# Patient Record
Sex: MALE | Race: WHITE | NOT HISPANIC OR LATINO | Employment: FULL TIME | URBAN - METROPOLITAN AREA
[De-identification: names, ages, dates, MRNs, and addresses within clinical notes are randomized per-mention and may not be internally consistent; named-entity substitution may affect disease eponyms.]

---

## 2018-03-12 ENCOUNTER — HISTORICAL (OUTPATIENT)
Dept: ADMINISTRATIVE | Facility: HOSPITAL | Age: 43
End: 2018-03-12

## 2018-03-12 LAB
ALBUMIN SERPL-MCNC: 4.4 GM/DL (ref 3.4–5)
ALBUMIN/GLOB SERPL: 1.67 {RATIO} (ref 1.5–2.5)
ALP SERPL-CCNC: 45 UNIT/L (ref 38–126)
ALT SERPL-CCNC: 20 UNIT/L (ref 7–52)
AST SERPL-CCNC: 15 UNIT/L (ref 15–37)
BILIRUB SERPL-MCNC: 0.8 MG/DL (ref 0.2–1)
BILIRUBIN DIRECT+TOT PNL SERPL-MCNC: 0.2 MG/DL (ref 0–0.5)
BUN SERPL-MCNC: 21 MG/DL (ref 7–18)
CALCIUM SERPL-MCNC: 9.4 MG/DL (ref 8.5–10)
CHLORIDE SERPL-SCNC: 100 MMOL/L (ref 98–107)
CHOLEST SERPL-MCNC: 144 MG/DL (ref 0–200)
CHOLEST/HDLC SERPL: 4.6 {RATIO}
CO2 SERPL-SCNC: 31 MMOL/L (ref 21–32)
CREAT SERPL-MCNC: 0.84 MG/DL (ref 0.6–1.3)
CREAT UR-MCNC: 200 MG/DL
CREAT/UREA NIT SERPL: 25
EST. AVERAGE GLUCOSE BLD GHB EST-MCNC: 157 MG/DL
GGT SERPL-CCNC: 30 UNIT/L (ref 5–85)
GLOBULIN SER-MCNC: 2.7 GM/DL (ref 1.2–3)
GLUCOSE SERPL-MCNC: 250 MG/DL (ref 74–106)
HBA1C MFR BLD: 7.1 % (ref 4.4–6.4)
HDLC SERPL-MCNC: 31 MG/DL (ref 35–60)
LDH SERPL-CCNC: 157 UNIT/L (ref 140–271)
LDLC SERPL CALC-MCNC: 88 MG/DL (ref 0–129)
MICROALBUMIN UR-MCNC: 30 MG/L
MICROALBUMIN/CREAT RATIO PNL UR: <30 MG/GM
POTASSIUM SERPL-SCNC: 5.1 MMOL/L (ref 3.5–5.1)
PROT SERPL-MCNC: 7.1 GM/DL (ref 6.4–8.2)
SODIUM SERPL-SCNC: 137 MMOL/L (ref 136–145)
TRIGL SERPL-MCNC: 120 MG/DL (ref 30–150)
TSH SERPL-ACNC: 4.22 MIU/ML (ref 0.35–4.94)
VLDLC SERPL CALC-MCNC: 24 MG/DL

## 2018-08-28 ENCOUNTER — HISTORICAL (OUTPATIENT)
Dept: ADMINISTRATIVE | Facility: HOSPITAL | Age: 43
End: 2018-08-28

## 2018-08-28 LAB
ABS NEUT (OLG): 3
ALBUMIN SERPL-MCNC: 4.5 GM/DL (ref 3.4–5)
ALBUMIN/GLOB SERPL: 1.61 {RATIO} (ref 1.5–2.5)
ALP SERPL-CCNC: 32 UNIT/L (ref 38–126)
ALT SERPL-CCNC: 27 UNIT/L (ref 7–52)
APPEARANCE, UA: CLEAR
AST SERPL-CCNC: 19 UNIT/L (ref 15–37)
BACTERIA #/AREA URNS AUTO: NORMAL /HPF
BILIRUB SERPL-MCNC: 0.5 MG/DL (ref 0.2–1)
BILIRUB UR QL STRIP: NEGATIVE MG/DL
BILIRUBIN DIRECT+TOT PNL SERPL-MCNC: 0.1 MG/DL (ref 0–0.5)
BILIRUBIN DIRECT+TOT PNL SERPL-MCNC: 0.4 MG/DL
BUN SERPL-MCNC: 22 MG/DL (ref 7–18)
CALCIUM SERPL-MCNC: 9.2 MG/DL (ref 8.5–10)
CHLORIDE SERPL-SCNC: 103 MMOL/L (ref 98–107)
CHOLEST SERPL-MCNC: 138 MG/DL (ref 0–200)
CHOLEST/HDLC SERPL: 4.5 {RATIO}
CO2 SERPL-SCNC: 27 MMOL/L (ref 21–32)
COLOR UR: YELLOW
CREAT SERPL-MCNC: 0.85 MG/DL (ref 0.6–1.3)
CREAT UR-MCNC: 100 MG/DL
ERYTHROCYTE [DISTWIDTH] IN BLOOD BY AUTOMATED COUNT: 13.1 % (ref 11.5–17)
EST. AVERAGE GLUCOSE BLD GHB EST-MCNC: 146 MG/DL
GLOBULIN SER-MCNC: 2.8 GM/DL (ref 1.2–3)
GLUCOSE (UA): NEGATIVE MG/DL
GLUCOSE SERPL-MCNC: 165 MG/DL (ref 74–106)
HBA1C MFR BLD: 6.7 % (ref 4.4–6.4)
HCT VFR BLD AUTO: 40.7 % (ref 42–52)
HDLC SERPL-MCNC: 31 MG/DL (ref 35–60)
HGB BLD-MCNC: 13.6 GM/DL (ref 14–18)
HGB UR QL STRIP: NEGATIVE UNIT/L
KETONES UR QL STRIP: NEGATIVE MG/DL
LDLC SERPL CALC-MCNC: 84 MG/DL (ref 0–129)
LEUKOCYTE ESTERASE UR QL STRIP: NEGATIVE UNIT/L
LYMPHOCYTES # BLD AUTO: 1.5 X10(3)/MCL (ref 0.6–3.4)
LYMPHOCYTES NFR BLD AUTO: 29.2 % (ref 13–40)
MCH RBC QN AUTO: 32.2 PG (ref 27–31.2)
MCHC RBC AUTO-ENTMCNC: 33 GM/DL (ref 32–36)
MCV RBC AUTO: 96 FL (ref 80–94)
MICROALBUMIN UR-MCNC: 30 MG/L
MICROALBUMIN/CREAT RATIO PNL UR: <30 MG/GM
MONOCYTES # BLD AUTO: 0.6 X10(3)/MCL (ref 0–1.8)
MONOCYTES NFR BLD AUTO: 10.8 % (ref 0.1–24)
NEUTROPHILS NFR BLD AUTO: 60 % (ref 47–80)
NITRITE UR QL STRIP.AUTO: NEGATIVE
PH UR STRIP: 5 [PH]
PLATELET # BLD AUTO: 214 X10(3)/MCL (ref 130–400)
PMV BLD AUTO: 9.1 FL
POTASSIUM SERPL-SCNC: 4.8 MMOL/L (ref 3.5–5.1)
PROT SERPL-MCNC: 7.3 GM/DL (ref 6.4–8.2)
PROT UR QL STRIP: NEGATIVE MG/DL
PSA SERPL-MCNC: 0.22 NG/ML (ref 0–2.5)
RBC # BLD AUTO: 4.22 X10(6)/MCL (ref 4.7–6.1)
RBC #/AREA URNS HPF: NORMAL /HPF
SODIUM SERPL-SCNC: 137 MMOL/L (ref 136–145)
SP GR UR STRIP: 1.02
SQUAMOUS EPITHELIAL, UA: NORMAL /LPF
TRIGL SERPL-MCNC: 147 MG/DL (ref 30–150)
TSH SERPL-ACNC: 4.61 MIU/ML (ref 0.35–4.94)
UROBILINOGEN UR STRIP-ACNC: 0.2 MG/DL
VLDLC SERPL CALC-MCNC: 29.4 MG/DL
WBC # SPEC AUTO: 5.1 X10(3)/MCL (ref 4.5–11.5)
WBC #/AREA URNS AUTO: NORMAL /[HPF]

## 2019-04-04 ENCOUNTER — HISTORICAL (OUTPATIENT)
Dept: ADMINISTRATIVE | Facility: HOSPITAL | Age: 44
End: 2019-04-04

## 2019-04-04 LAB
ALBUMIN SERPL-MCNC: 4.6 GM/DL (ref 3.4–5)
ALBUMIN/GLOB SERPL: 1.92 {RATIO} (ref 1.5–2.5)
ALP SERPL-CCNC: 33 UNIT/L (ref 38–126)
ALT SERPL-CCNC: 19 UNIT/L (ref 7–52)
AST SERPL-CCNC: 16 UNIT/L (ref 15–37)
BILIRUB SERPL-MCNC: 0.7 MG/DL (ref 0.2–1)
BILIRUBIN DIRECT+TOT PNL SERPL-MCNC: 0.1 MG/DL (ref 0–0.5)
BILIRUBIN DIRECT+TOT PNL SERPL-MCNC: 0.6 MG/DL
BUN SERPL-MCNC: 23 MG/DL (ref 7–18)
CALCIUM SERPL-MCNC: 9.7 MG/DL (ref 8.5–10)
CHLORIDE SERPL-SCNC: 100 MMOL/L (ref 98–107)
CHOLEST SERPL-MCNC: 162 MG/DL (ref 0–200)
CHOLEST/HDLC SERPL: 4.6 {RATIO}
CO2 SERPL-SCNC: 27 MMOL/L (ref 21–32)
CREAT SERPL-MCNC: 0.88 MG/DL (ref 0.6–1.3)
EST. AVERAGE GLUCOSE BLD GHB EST-MCNC: 169 MG/DL
GLOBULIN SER-MCNC: 2.4 GM/DL (ref 1.2–3)
GLUCOSE SERPL-MCNC: 157 MG/DL (ref 74–106)
HBA1C MFR BLD: 7.5 % (ref 4.4–6.4)
HDLC SERPL-MCNC: 35 MG/DL (ref 35–60)
LDLC SERPL CALC-MCNC: 82 MG/DL (ref 0–129)
POTASSIUM SERPL-SCNC: 4.9 MMOL/L (ref 3.5–5.1)
PROT SERPL-MCNC: 7 GM/DL (ref 6.4–8.2)
SODIUM SERPL-SCNC: 133 MMOL/L (ref 136–145)
TRIGL SERPL-MCNC: 223 MG/DL (ref 30–150)
TSH SERPL-ACNC: 12.86 MIU/ML (ref 0.35–4.94)
VLDLC SERPL CALC-MCNC: 44.6 MG/DL

## 2019-10-07 ENCOUNTER — HISTORICAL (OUTPATIENT)
Dept: ADMINISTRATIVE | Facility: HOSPITAL | Age: 44
End: 2019-10-07

## 2019-10-07 LAB
ABS NEUT (OLG): 3.3 X10(3)/MCL (ref 2.1–9.2)
ALBUMIN SERPL-MCNC: 4.4 GM/DL (ref 3.4–5)
ALBUMIN/GLOB SERPL: 1.52 {RATIO} (ref 1.5–2.5)
ALP SERPL-CCNC: 28 UNIT/L (ref 38–126)
ALT SERPL-CCNC: 19 UNIT/L (ref 7–52)
APPEARANCE, UA: CLEAR
AST SERPL-CCNC: 13 UNIT/L (ref 15–37)
BACTERIA #/AREA URNS AUTO: ABNORMAL /HPF
BILIRUB SERPL-MCNC: 0.6 MG/DL (ref 0.2–1)
BILIRUB UR QL STRIP: NEGATIVE MG/DL
BILIRUBIN DIRECT+TOT PNL SERPL-MCNC: 0.1 MG/DL (ref 0–0.5)
BILIRUBIN DIRECT+TOT PNL SERPL-MCNC: 0.5 MG/DL
BUN SERPL-MCNC: 15 MG/DL (ref 7–18)
CALCIUM SERPL-MCNC: 9.3 MG/DL (ref 8.5–10)
CHLORIDE SERPL-SCNC: 98 MMOL/L (ref 98–107)
CHOLEST SERPL-MCNC: 143 MG/DL (ref 0–200)
CHOLEST/HDLC SERPL: 4.1 {RATIO}
CO2 SERPL-SCNC: 29 MMOL/L (ref 21–32)
COLOR UR: YELLOW
CREAT SERPL-MCNC: 0.79 MG/DL (ref 0.6–1.3)
ERYTHROCYTE [DISTWIDTH] IN BLOOD BY AUTOMATED COUNT: 13.1 % (ref 11.5–17)
EST. AVERAGE GLUCOSE BLD GHB EST-MCNC: 171 MG/DL
GLOBULIN SER-MCNC: 2.9 GM/DL (ref 1.2–3)
GLUCOSE (UA): ABNORMAL MG/DL
GLUCOSE SERPL-MCNC: 236 MG/DL (ref 74–106)
HBA1C MFR BLD: 7.6 % (ref 4.4–6.4)
HCT VFR BLD AUTO: 39.3 % (ref 42–52)
HDLC SERPL-MCNC: 35 MG/DL (ref 35–60)
HGB BLD-MCNC: 13.6 GM/DL (ref 14–18)
HGB UR QL STRIP: NEGATIVE UNIT/L
KETONES UR QL STRIP: NEGATIVE MG/DL
LDLC SERPL CALC-MCNC: 81 MG/DL (ref 0–129)
LEUKOCYTE ESTERASE UR QL STRIP: NEGATIVE UNIT/L
LYMPHOCYTES # BLD AUTO: 1.6 X10(3)/MCL (ref 0.6–3.4)
LYMPHOCYTES NFR BLD AUTO: 28.2 % (ref 13–40)
MCH RBC QN AUTO: 32.2 PG (ref 27–31.2)
MCHC RBC AUTO-ENTMCNC: 35 GM/DL (ref 32–36)
MCV RBC AUTO: 93 FL (ref 80–94)
MONOCYTES # BLD AUTO: 0.6 X10(3)/MCL (ref 0.1–1.3)
MONOCYTES NFR BLD AUTO: 11.5 % (ref 0.1–24)
NEUTROPHILS NFR BLD AUTO: 60.3 % (ref 47–80)
NITRITE UR QL STRIP.AUTO: NEGATIVE
PH UR STRIP: 6 [PH]
PLATELET # BLD AUTO: 226 X10(3)/MCL (ref 130–400)
PMV BLD AUTO: 9.4 FL (ref 9.4–12.4)
POTASSIUM SERPL-SCNC: 4.4 MMOL/L (ref 3.5–5.1)
PROT SERPL-MCNC: 7.3 GM/DL (ref 6.4–8.2)
PROT UR QL STRIP: NEGATIVE MG/DL
PSA SERPL-MCNC: 0.18 NG/ML (ref 0–2.5)
RBC # BLD AUTO: 4.23 X10(6)/MCL (ref 4.7–6.1)
RBC #/AREA URNS HPF: ABNORMAL /HPF
SODIUM SERPL-SCNC: 135 MMOL/L (ref 136–145)
SP GR UR STRIP: 1.02
SQUAMOUS EPITHELIAL, UA: ABNORMAL /LPF
TRIGL SERPL-MCNC: 163 MG/DL (ref 30–150)
TSH SERPL-ACNC: 2.73 MIU/ML (ref 0.35–4.94)
UROBILINOGEN UR STRIP-ACNC: 0.2 MG/DL
VLDLC SERPL CALC-MCNC: 32.6 MG/DL
WBC # SPEC AUTO: 5.5 X10(3)/MCL (ref 4.5–11.5)
WBC #/AREA URNS AUTO: ABNORMAL /[HPF]

## 2020-05-13 ENCOUNTER — HISTORICAL (OUTPATIENT)
Dept: ADMINISTRATIVE | Facility: HOSPITAL | Age: 45
End: 2020-05-13

## 2020-05-13 LAB
ALBUMIN SERPL-MCNC: 4.5 GM/DL (ref 3.4–5)
ALBUMIN/GLOB SERPL: 1.61 {RATIO} (ref 1.5–2.5)
ALP SERPL-CCNC: 34 UNIT/L (ref 38–126)
ALT SERPL-CCNC: 15 UNIT/L (ref 7–52)
AST SERPL-CCNC: 12 UNIT/L (ref 15–37)
BILIRUB SERPL-MCNC: 0.7 MG/DL (ref 0.2–1)
BILIRUBIN DIRECT+TOT PNL SERPL-MCNC: 0.1 MG/DL (ref 0–0.5)
BILIRUBIN DIRECT+TOT PNL SERPL-MCNC: 0.6 MG/DL
BUN SERPL-MCNC: 14 MG/DL (ref 7–18)
CALCIUM SERPL-MCNC: 9.7 MG/DL (ref 8.5–10)
CHLORIDE SERPL-SCNC: 97 MMOL/L (ref 98–107)
CHOLEST SERPL-MCNC: 144 MG/DL (ref 0–200)
CHOLEST/HDLC SERPL: 4.1 {RATIO}
CO2 SERPL-SCNC: 31 MMOL/L (ref 21–32)
CREAT SERPL-MCNC: 0.8 MG/DL (ref 0.6–1.3)
EST. AVERAGE GLUCOSE BLD GHB EST-MCNC: 174 MG/DL
GLOBULIN SER-MCNC: 2.8 GM/DL (ref 1.2–3)
GLUCOSE SERPL-MCNC: 149 MG/DL (ref 74–106)
HBA1C MFR BLD: 7.7 % (ref 4.4–6.4)
HDLC SERPL-MCNC: 35 MG/DL (ref 35–60)
LDLC SERPL CALC-MCNC: 79 MG/DL (ref 0–129)
POTASSIUM SERPL-SCNC: 4.8 MMOL/L (ref 3.5–5.1)
PROT SERPL-MCNC: 7.3 GM/DL (ref 6.4–8.2)
SODIUM SERPL-SCNC: 136 MMOL/L (ref 136–145)
TRIGL SERPL-MCNC: 211 MG/DL (ref 30–150)
TSH SERPL-ACNC: 7.8 MIU/ML (ref 0.35–4.94)
VLDLC SERPL CALC-MCNC: 42.2 MG/DL

## 2020-11-24 ENCOUNTER — HISTORICAL (OUTPATIENT)
Dept: ADMINISTRATIVE | Facility: HOSPITAL | Age: 45
End: 2020-11-24

## 2020-11-24 LAB
ABS NEUT (OLG): 4.3 X10(3)/MCL (ref 2.1–9.2)
ALBUMIN SERPL-MCNC: 4.4 GM/DL (ref 3.4–5)
ALBUMIN/GLOB SERPL: 1.57 {RATIO} (ref 1.5–2.5)
ALP SERPL-CCNC: 33 UNIT/L (ref 38–126)
ALT SERPL-CCNC: 16 UNIT/L (ref 7–52)
AST SERPL-CCNC: 12 UNIT/L (ref 15–37)
BILIRUB SERPL-MCNC: 0.6 MG/DL (ref 0.2–1)
BILIRUBIN DIRECT+TOT PNL SERPL-MCNC: 0.1 MG/DL (ref 0–0.5)
BILIRUBIN DIRECT+TOT PNL SERPL-MCNC: 0.5 MG/DL
BUN SERPL-MCNC: 22 MG/DL (ref 7–18)
CALCIUM SERPL-MCNC: 9.8 MG/DL (ref 8.5–10.1)
CHLORIDE SERPL-SCNC: 95 MMOL/L (ref 98–107)
CHOLEST SERPL-MCNC: 174 MG/DL (ref 0–200)
CHOLEST/HDLC SERPL: 5.1 {RATIO}
CO2 SERPL-SCNC: 27 MMOL/L (ref 21–32)
CREAT SERPL-MCNC: 0.9 MG/DL (ref 0.6–1.3)
ERYTHROCYTE [DISTWIDTH] IN BLOOD BY AUTOMATED COUNT: 13 % (ref 11.5–17)
EST. AVERAGE GLUCOSE BLD GHB EST-MCNC: 212 MG/DL
GLOBULIN SER-MCNC: 2.8 GM/DL (ref 1.2–3)
GLUCOSE SERPL-MCNC: 277 MG/DL (ref 74–106)
HBA1C MFR BLD: 9 % (ref 4.4–6.4)
HCT VFR BLD AUTO: 43.3 % (ref 42–52)
HDLC SERPL-MCNC: 34 MG/DL (ref 35–60)
HGB BLD-MCNC: 14.3 GM/DL (ref 14–18)
LDLC SERPL CALC-MCNC: 77 MG/DL (ref 0–129)
LYMPHOCYTES # BLD AUTO: 1.7 X10(3)/MCL (ref 0.6–3.4)
LYMPHOCYTES NFR BLD AUTO: 24.4 % (ref 13–40)
MCH RBC QN AUTO: 31.2 PG (ref 27–31.2)
MCHC RBC AUTO-ENTMCNC: 33 GM/DL (ref 32–36)
MCV RBC AUTO: 94 FL (ref 80–94)
MONOCYTES # BLD AUTO: 0.9 X10(3)/MCL (ref 0.1–1.3)
MONOCYTES NFR BLD AUTO: 12.6 % (ref 0.1–24)
NEUTROPHILS NFR BLD AUTO: 63 % (ref 47–80)
PLATELET # BLD AUTO: 248 X10(3)/MCL (ref 130–400)
PMV BLD AUTO: 9.5 FL (ref 9.4–12.4)
POTASSIUM SERPL-SCNC: 4.9 MMOL/L (ref 3.5–5.1)
PROT SERPL-MCNC: 7.2 GM/DL (ref 6.4–8.2)
PSA SERPL-MCNC: 0.19 NG/ML (ref 0–2.5)
RBC # BLD AUTO: 4.59 X10(6)/MCL (ref 4.7–6.1)
SODIUM SERPL-SCNC: 134 MMOL/L (ref 136–145)
TRIGL SERPL-MCNC: 530 MG/DL (ref 30–150)
TSH SERPL-ACNC: 3.29 MIU/ML (ref 0.35–4.94)
VLDLC SERPL CALC-MCNC: 106 MG/DL
WBC # SPEC AUTO: 6.9 X10(3)/MCL (ref 4.5–11.5)

## 2021-04-05 ENCOUNTER — HISTORICAL (OUTPATIENT)
Dept: ADMINISTRATIVE | Facility: HOSPITAL | Age: 46
End: 2021-04-05

## 2021-04-05 LAB
ALBUMIN SERPL-MCNC: 4.5 GM/DL (ref 3.4–5)
ALBUMIN/GLOB SERPL: 1.67 {RATIO} (ref 1.5–2.5)
ALP SERPL-CCNC: 29 UNIT/L (ref 38–126)
ALT SERPL-CCNC: 15 UNIT/L (ref 7–52)
AST SERPL-CCNC: 13 UNIT/L (ref 15–37)
BILIRUB SERPL-MCNC: 0.6 MG/DL (ref 0.2–1)
BILIRUBIN DIRECT+TOT PNL SERPL-MCNC: 0.1 MG/DL (ref 0–0.5)
BILIRUBIN DIRECT+TOT PNL SERPL-MCNC: 0.5 MG/DL
BUN SERPL-MCNC: 25 MG/DL (ref 7–18)
CALCIUM SERPL-MCNC: 9.6 MG/DL (ref 8.5–10.1)
CHLORIDE SERPL-SCNC: 95 MMOL/L (ref 98–107)
CHOLEST SERPL-MCNC: 149 MG/DL (ref 0–200)
CHOLEST/HDLC SERPL: 4.5 {RATIO}
CO2 SERPL-SCNC: 28 MMOL/L (ref 21–32)
CREAT SERPL-MCNC: 0.92 MG/DL (ref 0.6–1.3)
EST. AVERAGE GLUCOSE BLD GHB EST-MCNC: 214 MG/DL
GLOBULIN SER-MCNC: 2.7 GM/DL (ref 1.2–3)
GLUCOSE SERPL-MCNC: 272 MG/DL (ref 74–106)
HBA1C MFR BLD: 9.1 % (ref 4.4–6.4)
HDLC SERPL-MCNC: 33 MG/DL (ref 35–60)
LDLC SERPL CALC-MCNC: 67 MG/DL (ref 0–129)
POTASSIUM SERPL-SCNC: 4.7 MMOL/L (ref 3.5–5.1)
PROT SERPL-MCNC: 7.2 GM/DL (ref 6.4–8.2)
SODIUM SERPL-SCNC: 133 MMOL/L (ref 136–145)
TRIGL SERPL-MCNC: 318 MG/DL (ref 30–150)
VLDLC SERPL CALC-MCNC: 63.6 MG/DL

## 2021-08-25 ENCOUNTER — HISTORICAL (OUTPATIENT)
Dept: ADMINISTRATIVE | Facility: HOSPITAL | Age: 46
End: 2021-08-25

## 2021-08-25 LAB
ABS NEUT (OLG): 4.7 X10(3)/MCL (ref 2.1–9.2)
ALBUMIN SERPL-MCNC: 4.3 GM/DL (ref 3.4–5)
ALBUMIN/GLOB SERPL: 1.34 {RATIO} (ref 1.5–2.5)
ALP SERPL-CCNC: 39 UNIT/L (ref 38–126)
ALT SERPL-CCNC: 12 UNIT/L (ref 7–52)
AST SERPL-CCNC: 14 UNIT/L (ref 15–37)
BILIRUB SERPL-MCNC: 0.8 MG/DL (ref 0.2–1)
BILIRUBIN DIRECT+TOT PNL SERPL-MCNC: 0.2 MG/DL (ref 0–0.5)
BILIRUBIN DIRECT+TOT PNL SERPL-MCNC: 0.6 MG/DL
BUN SERPL-MCNC: 22 MG/DL (ref 7–18)
CALCIUM SERPL-MCNC: 9.6 MG/DL (ref 8.5–10.1)
CHLORIDE SERPL-SCNC: 94 MMOL/L (ref 98–107)
CO2 SERPL-SCNC: 27 MMOL/L (ref 21–32)
CREAT SERPL-MCNC: 1.17 MG/DL (ref 0.6–1.3)
ERYTHROCYTE [DISTWIDTH] IN BLOOD BY AUTOMATED COUNT: 13.8 % (ref 11.5–17)
EST. AVERAGE GLUCOSE BLD GHB EST-MCNC: 212 MG/DL
GLOBULIN SER-MCNC: 3.2 GM/DL (ref 1.2–3)
GLUCOSE SERPL-MCNC: 237 MG/DL (ref 74–106)
HBA1C MFR BLD: 9 % (ref 4.4–6.4)
HCT VFR BLD AUTO: 39.7 % (ref 42–52)
HGB BLD-MCNC: 12.9 GM/DL (ref 14–18)
LYMPHOCYTES # BLD AUTO: 1.1 X10(3)/MCL (ref 0.6–3.4)
LYMPHOCYTES NFR BLD AUTO: 18.2 % (ref 13–40)
MCH RBC QN AUTO: 30.6 PG (ref 27–31.2)
MCHC RBC AUTO-ENTMCNC: 32 GM/DL (ref 32–36)
MCV RBC AUTO: 94 FL (ref 80–94)
MONOCYTES # BLD AUTO: 0.5 X10(3)/MCL (ref 0.1–1.3)
MONOCYTES NFR BLD AUTO: 8.1 % (ref 0.1–24)
NEUTROPHILS NFR BLD AUTO: 73.7 % (ref 47–80)
PLATELET # BLD AUTO: 272 X10(3)/MCL (ref 130–400)
PMV BLD AUTO: 8.8 FL (ref 9.4–12.4)
POTASSIUM SERPL-SCNC: 4.8 MMOL/L (ref 3.5–5.1)
PROT SERPL-MCNC: 7.5 GM/DL (ref 6.4–8.2)
RBC # BLD AUTO: 4.21 X10(6)/MCL (ref 4.7–6.1)
SODIUM SERPL-SCNC: 131 MMOL/L (ref 136–145)
WBC # SPEC AUTO: 6.3 X10(3)/MCL (ref 4.5–11.5)

## 2021-12-06 ENCOUNTER — HISTORICAL (OUTPATIENT)
Dept: ADMINISTRATIVE | Facility: HOSPITAL | Age: 46
End: 2021-12-06

## 2021-12-06 LAB
ABS NEUT (OLG): 3.8 X10(3)/MCL (ref 2.1–9.2)
ALBUMIN SERPL-MCNC: 4.3 GM/DL (ref 3.4–5)
ALBUMIN/GLOB SERPL: 1.65 {RATIO} (ref 1.5–2.5)
ALP SERPL-CCNC: 35 UNIT/L (ref 38–126)
ALT SERPL-CCNC: 13 UNIT/L (ref 7–52)
AST SERPL-CCNC: 13 UNIT/L (ref 15–37)
BILIRUB SERPL-MCNC: 0.4 MG/DL (ref 0.2–1)
BILIRUBIN DIRECT+TOT PNL SERPL-MCNC: 0.1 MG/DL (ref 0–0.5)
BILIRUBIN DIRECT+TOT PNL SERPL-MCNC: 0.3 MG/DL
BUN SERPL-MCNC: 19 MG/DL (ref 7–18)
CALCIUM SERPL-MCNC: 9.4 MG/DL (ref 8.5–10.1)
CHLORIDE SERPL-SCNC: 99 MMOL/L (ref 98–107)
CHOLEST SERPL-MCNC: 128 MG/DL (ref 0–200)
CHOLEST/HDLC SERPL: 4 {RATIO}
CO2 SERPL-SCNC: 29 MMOL/L (ref 21–32)
CREAT SERPL-MCNC: 0.82 MG/DL (ref 0.6–1.3)
ERYTHROCYTE [DISTWIDTH] IN BLOOD BY AUTOMATED COUNT: 13 % (ref 11.5–17)
EST. AVERAGE GLUCOSE BLD GHB EST-MCNC: 154 MG/DL
GLOBULIN SER-MCNC: 2.6 GM/DL (ref 1.2–3)
GLUCOSE SERPL-MCNC: 232 MG/DL (ref 74–106)
HBA1C MFR BLD: 7 % (ref 4.4–6.4)
HCT VFR BLD AUTO: 38 % (ref 42–52)
HDLC SERPL-MCNC: 32 MG/DL (ref 35–60)
HGB BLD-MCNC: 12.4 GM/DL (ref 14–18)
LDLC SERPL CALC-MCNC: 70 MG/DL (ref 0–129)
LYMPHOCYTES # BLD AUTO: 1.1 X10(3)/MCL (ref 0.6–3.4)
LYMPHOCYTES NFR BLD AUTO: 21.2 % (ref 13–40)
MCH RBC QN AUTO: 30.5 PG (ref 27–31.2)
MCHC RBC AUTO-ENTMCNC: 33 GM/DL (ref 32–36)
MCV RBC AUTO: 93 FL (ref 80–94)
MONOCYTES # BLD AUTO: 0.4 X10(3)/MCL (ref 0.1–1.3)
MONOCYTES NFR BLD AUTO: 6.7 % (ref 0.1–24)
NEUTROPHILS NFR BLD AUTO: 72.1 % (ref 47–80)
PLATELET # BLD AUTO: 226 X10(3)/MCL (ref 130–400)
PMV BLD AUTO: 10 FL (ref 9.4–12.4)
POTASSIUM SERPL-SCNC: 4.8 MMOL/L (ref 3.5–5.1)
PROT SERPL-MCNC: 6.9 GM/DL (ref 6.4–8.2)
PSA SERPL-MCNC: 0.24 NG/ML (ref 0–2.5)
RBC # BLD AUTO: 4.07 X10(6)/MCL (ref 4.7–6.1)
SODIUM SERPL-SCNC: 136 MMOL/L (ref 136–145)
TRIGL SERPL-MCNC: 112 MG/DL (ref 30–150)
TSH SERPL-ACNC: 2.03 MIU/ML (ref 0.35–4.94)
VLDLC SERPL CALC-MCNC: 22.4 MG/DL
WBC # SPEC AUTO: 5.3 X10(3)/MCL (ref 4.5–11.5)

## 2022-04-10 ENCOUNTER — HISTORICAL (OUTPATIENT)
Dept: ADMINISTRATIVE | Facility: HOSPITAL | Age: 47
End: 2022-04-10

## 2022-04-25 VITALS
WEIGHT: 295.44 LBS | BODY MASS INDEX: 35.98 KG/M2 | DIASTOLIC BLOOD PRESSURE: 72 MMHG | SYSTOLIC BLOOD PRESSURE: 128 MMHG | HEIGHT: 76 IN

## 2022-05-03 NOTE — HISTORICAL [facility] CERNER
This is a historical note converted from Fredo. Formatting and pictures may have been removed.  Please reference Fredo for original formatting and attached multimedia. Chief Complaint  CPX/FASTING  History of Present Illness  Here for CPX/6 month recheck DM.? He continues to tolerate all medications without side effects.? He is currently doing the keto diet?with his wife to help him?lose weight.? 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 He is using his CPAP nightly?but has had trouble getting in contact with the?sleep supply company?to provide records of his use.? His diabetic eye exam is up-to-date.  Review of Systems  GENERAL:?no? unexplained wt ?loss or gain, fever, fatigue, chills, night sweats or ?weakness  HEENT: no?? sore throat, ?ear pain, ?sinus pressure, ?nasal congestion, or ?rhinorrhea--wearing CPAP nightly  VISION:?no ?vision changes, ?glaucoma, cataracts-DM eye exam utd  CARDIAC: no? chest pain,??palpitations,?Dyspnea on exertion, ?orthopnea  RESPIRATORY:?no??cough,?wheezing, sputum production or?SOB  GI: no???abdominal pain, n&v,?constipation, diarrhea,??blood in stool or_no ?family history of colon cancer?_  :?no? dysuria, ?hematuria, ?frequency, urgency, ?incontinence,? testicular pain/swelling,?_no ?family history of prostate cancer_  [facility]:? no? myalgia, ?weakness, edema,? arthralgia, or ?joint effusion  SKIN:? No?rash, hives,?itching or?sores  NEURO:? No?headaches, numbness, tingling,? weakness, or ?dizziness  PSYCH:? No anxiety, depression, ?irritability, ?suicidal ideation or hallucinations  ENDO:? No ?polyuria, ?polydipsia, ?polyphagia  HEME:? No Bruising, lymphadenopathy, bleeding disorders ?or?signs of anemia  Physical Exam  Vitals & Measurements  HR:?68(Peripheral)? BP:?130/88?  HT:?193?cm? WT:?153?kg? BMI:?41.07?  GENERAL: NAD, alert and oriented x 3  SKIN:? no rash or abnormal appearing skin  lesions  HEENT:? PERRLA, EOMI, mouth wnl, throat wnl, EAC and TM wnl bilaterally  NECK:? FROM, no lymphadenopathy, no thyroid abnormalities palpable  CHEST:? CTA bilaterally no wheezes, crackles or rubs  CARDIAC:? RRR, no murmurs audible  ABDOMEN:? Soft, nontender, nondistended, NBSx4,?no rebound or guarding, no HSM  EXTREMITIES:? no clubbing, cyanosis,?+1 B lower ext. ?edema, ? joints wnl. +2 DP/PT pulse bilaterally  NEURO:? no sensory or motor deficits noted. CN II-XII intact. Gait wnl.?  GENITAL: normal testes, no hernia  RECTAL: no hemorrhoids or fissures, prostate WNL  Assessment/Plan  1.?Wellness examination?Z00.00  ?CBC, CMP, FLP, U/A, PSA, A1C, on ACE, TSH, Encourage pt to increase cardiovascular exercise and attempt to obtain at least 150 minutes of moderate aerobic exercise per week or 75 minutes of vigorous aerobic exercise weekly.  Ordered:  Comprehensive Metabolic Panel, Routine collect, 10/07/19 11:24:00 CDT, Blood, Stop date 10/07/19 11:24:00 CDT, Lab Collect, Wellness examination  Diabetes mellitus  HTN - Hypertension  Hypercholesteremia, 10/07/19 11:24:00 CDT  Lipid Panel, Routine collect, 10/07/19 11:24:00 CDT, Blood, Stop date 10/07/19 11:24:00 CDT, Lab Collect, Wellness examination  Hypercholesteremia, 10/07/19 11:24:00 CDT  [facility] Health Care Est 40-64 years [id number] PC, Wellness examination  Diabetes mellitus  HTN - Hypertension  Hypercholesteremia  Hypothyroid  Obstructive sleep apnea of adult, [facility] - AFP, 10/07/19 10:55:00 CDT  Prostate Specific Antigen, Routine collect, 10/07/19 11:24:00 CDT, Blood, Stop date 10/07/19 11:24:00 CDT, Lab Collect, Wellness examination  Screening for prostate cancer, 10/07/19 11:24:00 CDT  ?  2.?Screening for prostate cancer?Z12.5  PSA  Ordered:  Prostate Specific Antigen, Routine collect, 10/07/19 11:24:00 CDT, Blood, Stop date 10/07/19 11:24:00 CDT, Lab Collect, Wellness examination  Screening for prostate cancer, 10/07/19 11:24:00  CDT  ?  3.?Encounter for immunization?Z23  ?flu shot--decline  ?  4.?Diabetes mellitus?E11.9  ?Last A1C 7.5 --check today, continue glyburide/metformn 5/500 2 po bid, DM foot exam 4/2019, DM eye exam 4/2019  Ordered:  Clinic Follow up, *Est. 04/08/20 10:30:00 CDT, Order for future visit, Obstructive sleep apnea of adult, [facility] AFP  Comprehensive Metabolic Panel, Routine collect, 10/07/19 11:24:00 CDT, Blood, Stop date 10/07/19 11:24:00 CDT, Lab Collect, Wellness examination  Diabetes mellitus  HTN - Hypertension  Hypercholesteremia, 10/07/19 11:24:00 CDT  Preventative Health Care Est 40-64 years [id number] PC, Wellness examination  Diabetes mellitus  HTN - Hypertension  Hypercholesteremia  Hypothyroid  Obstructive sleep apnea of adult, [facility] - AFP, 10/07/19 10:55:00 CDT  ?  5.?HTN - Hypertension?I10  Borderline today,?continue lisinopril hct 20/25, add coreg 6.25 bid  Ordered:  Clinic Follow up, *Est. 04/08/20 10:30:00 CDT, Order for future visit, Obstructive sleep apnea of adult, [facility] AFP  Comprehensive Metabolic Panel, Routine collect, 10/07/19 11:24:00 CDT, Blood, Stop date 10/07/19 11:24:00 CDT, Lab Collect, Wellness examination  Diabetes mellitus  HTN - Hypertension  Hypercholesteremia, 10/07/19 11:24:00 CDT  Preventative Health Care Est 40-64 years [id number] PC, Wellness examination  Diabetes mellitus  HTN - Hypertension  Hypercholesteremia  Hypothyroid  Obstructive sleep apnea of adult, [facility] - AFP, 10/07/19 10:55:00 CDT  ?  6.?Hypercholesteremia?E78.00  ?continue simvastatin 10 mg  Ordered:  Clinic Follow up, *Est. 04/08/20 10:30:00 CDT, Order for future visit, Obstructive sleep apnea of adult, [facility] AFP  Comprehensive Metabolic Panel, Routine collect, 10/07/19 11:24:00 CDT, Blood, Stop date 10/07/19 11:24:00 CDT, Lab Collect, Wellness examination  Diabetes mellitus  HTN - Hypertension  Hypercholesteremia, 10/07/19 11:24:00 CDT  Lipid Panel, Routine collect, 10/07/19 11:24:00 CDT, Blood,  Stop date 10/07/19 11:24:00 CDT, Lab Collect, Wellness examination  Hypercholesteremia, 10/07/19 11:24:00 CDT  Preventative Health Care Est 40-64 years [id number] PC, Wellness examination  Diabetes mellitus  HTN - Hypertension  Hypercholesteremia  Hypothyroid  Obstructive sleep apnea of adult, [facility], 10/07/19 10:55:00 CDT  ?  7.?Hypothyroid?E03.9  ?check TSH, continue levothyroxine 150 mcg  Ordered:  Clinic Follow up, *Est. 04/08/20 10:30:00 CDT, Order for future visit, Obstructive sleep apnea of adult, [facility]  Preventative [facility] Care Est 40-64 years [id number] PC, Wellness examination  Diabetes mellitus  HTN - Hypertension  Hypercholesteremia  Hypothyroid  Obstructive sleep apnea of adult, [facility], 10/07/19 10:55:00 CDT  Thyroid Stimulating Hormone, Routine collect, 10/07/19 11:24:00 CDT, Blood, Stop date 10/07/19 11:24:00 CDT, Lab Collect, Hypothyroid, 10/07/19 11:24:00 CDT  ?  8.?Obstructive sleep apnea of adult?G47.33  Need CPAP supplies.  Ordered:  Clinic Follow up, *Est. 04/08/20 10:30:00 CDT, Order for future visit, Obstructive sleep apnea of adult, [facility] Care Est 40-64 years [id number] PC, Wellness examination  Diabetes mellitus  HTN - Hypertension  Hypercholesteremia  Hypothyroid  Obstructive sleep apnea of adult, [facility], 10/07/19 10:55:00 CDT  ?  Orders:  carvedilol, 6.25 mg = 1 tab(s), Oral, BID, # 60 tab(s), 6 Refill(s), Pharmacy: [facility] [id number] [facility]  Referrals  Clinic Follow up, *Est. 04/08/20 10:30:00 CDT, Order for future visit, Obstructive sleep apnea of adult, [facility]   Problem List/Past Medical History  Ongoing  AR (allergic rhinitis)  Cervical disc disease  Diabetes mellitus  HTN - Hypertension  Hypercholesteremia  Hypothyroid  Left shoulder pain  Obstructive sleep apnea of adult  Preop examination  Historical  Acute pancreatitis  right Cellulitis of lower leg  Procedure/Surgical History  Back surgery-microdiscectomy on l-3 and l-4  (06/01/2018)  Cervical disc surgery (06.2015)  Dentures   Medications  Coreg 6.25 mg oral tablet, 6.25 mg= 1 tab(s), Oral, BID, 6 refills  glyburide-metformin 5 mg-500 mg oral tablet, See Instructions, 1 refills  hydrochlorothiazide-lisinopril 25 mg-20 mg oral tablet, See Instructions, 5 refills  levothyroxine 150 mcg (0.15 mg) oral tablet, See Instructions, 1 refills  ONE TOUCH VERIO TEST STRIP, See Instructions, 11 refills  simvastatin 10 mg oral tablet, See Instructions, 5 refills  Allergies  No Known Medication Allergies  Social History  Abuse/Neglect  No, 10/07/2019  No, 07/31/2019  Alcohol  Past, 01/28/2018  Employment/School  Employed, Work/School description: ., 01/28/2018  Home/Environment  Lives with Spouse., 03/12/2018  Tobacco  Former smoker, quit more than 30 days ago, N/A, 10/07/2019  Former smoker, quit more than 30 days ago, N/A, 07/31/2019  Former smoker, quit more than 30 days ago, N/A, 04/04/2019  Former smoker, N/A, 12/03/2018  Former smoker, N/A, 12/02/2018  Former smoker, Cigarettes, 01/28/2018  Family History  Diabetes mellitus: Mother and Brother.  Hypertension.: Mother.  Lower back injury: Brother.Negative: Sister.  Neuropathy: Brother.  Father: History is unknown  Immunizations  Vaccine Date Status Comments   pneumococcal 23-polyvalent vaccine 08/28/2018 Given    tetanus/diphth/pertuss (Tdap) adult/adol 2014 Recorded    diphtheria/pertussis, acellular/tetanus 2014 Recorded hospital   Health Maintenance  Health Maintenance  ???Pending?(in the next year)  ??? ??OverDue  ??? ? ? ?Alcohol Misuse Screening due??01/01/19??and every 1??year(s)  ??? ??Due?  ??? ? ? ?ADL Screening due??10/07/19??and every 1??year(s)  ??? ? ? ?Depression Screening due??10/07/19??and every?  ??? ? ? ?Hypertension Management-Education due??10/07/19??and every 1??year(s)  ??? ? ? ?Influenza Vaccine due??10/07/19??and every?  ??? ??Due In Future?  ??? ? ? ?Obesity Screening not due  until??01/01/20??and every 1??year(s)  ??? ? ? ?Diabetes Maintenance-Fasting Lipid Profile not due until??04/03/20??and every 1??year(s)  ??? ? ? ?Diabetes Maintenance-Foot Exam not due until??04/03/20??and every 1??year(s)  ??? ? ? ?Hypertension Management-BMP not due until??04/03/20??and every 1??year(s)  ??? ? ? ?Diabetes Maintenance-Serum Creatinine not due until??04/04/20??and every 1??year(s)  ??? ? ? ?Diabetes Maintenance-Eye Exam not due until??04/10/20??and every 1??year(s)  ??? ? ? ?Blood Pressure Screening not due until??10/06/20??and every 1??year(s)  ??? ? ? ?Body Mass Index Check not due until??10/06/20??and every 1??year(s)  ??? ? ? ?Diabetes Maintenance-HgbA1c not due until??10/06/20??and every 1??year(s)  ??? ? ? ?Hypertension Management-Blood Pressure not due until??10/06/20??and every 1??year(s)  ???Satisfied?(in the past 1 year)  ??? ??Satisfied?  ??? ? ? ?Blood Pressure Screening on??10/07/19.??Satisfied by Mary Beth Bee CMA.  ??? ? ? ?Body Mass Index Check on??10/07/19.??Satisfied by Mary Beth Bee CMA.  ??? ? ? ?Diabetes Maintenance-Eye Exam on??04/11/19.??Satisfied by Kaycee Benitez  ??? ? ? ?Diabetes Maintenance-Fasting Lipid Profile on??04/04/19.??Satisfied by Simón Madrid  ??? ? ? ?Diabetes Maintenance-Foot Exam on??04/04/19.??Satisfied by Fred Pandey MD  ??? ? ? ?Diabetes Maintenance-HgbA1c on??10/07/19.??Satisfied by Simón Madrid  ??? ? ? ?Diabetes Maintenance-Serum Creatinine on??04/04/19.??Satisfied by Simón Madrid  ??? ? ? ?Diabetes Maintenance-Urine Dipstick on??10/07/19.??Satisfied by Simón Madrid  ??? ? ? ?Diabetes Screening on??10/07/19.??Satisfied by Simón Madrid  ??? ? ? ?Hypertension Management-Blood Pressure on??10/07/19.??Satisfied by Mary Beth Bee CMA  ??? ? ? ?Hypertension Management-BMP on??04/04/19.??Satisfied by Simón Madrid  ??? ? ? ?Influenza Vaccine on??10/07/19.??Satisfied by Mary Beth Bee CMA  ??? ? ? ?Lipid Screening  on??04/04/19.??Satisfied by Simón Madrid  ??? ? ? ?Obesity Screening on??10/07/19.??Satisfied by Mary Beth Bee CMA  ?      Clarification?of above:?Patient uses CPAP nightly with good results.? He needs?replacement CPAP?supplies.

## 2022-05-03 NOTE — HISTORICAL [facility]ERNER
This is a historical note converted from Fredo. Formatting and pictures may have been removed.  Please reference Fredo for original formatting and attached multimedia. Chief Complaint  CPX/FASTING  History of Present Illness  Patient is here today for wellness CPX and recheck diabetes. ?He is tolerating all medications without side effects. ?He has been participating in a virtual clinic?that has been helping him with diabetes.? He has lost?31 pounds over the past several months.? His virtual clinic told him to discontinue?glyburide.? I was not notified of this?but will adjust his medications pending his A1c.? Overall he is feeling good. ?He did not get the Covid vaccine and does not want a flu shot.  Review of Systems  GENERAL:?no? unexplained wt ?loss?31lbs ,no ?fever, fatigue, chills, night sweats or ?weakness  HEENT: no?? sore throat, ?ear pain, ?sinus pressure, ?nasal congestion, or ?rhinorrhea, mild epistaxix  VISION:?no ?vision changes, ?glaucoma, cataracts--DM eye exam due 12/29/2021--in [place]  CARDIAC: no? chest pain,??palpitations,?Dyspnea on exertion, ?orthopnea  RESPIRATORY:?no??cough,?wheezing, sputum production or?SOB--had covid, no Vaccine  GI: no???abdominal pain, n&v,?constipation, diarrhea,??blood in stool or_?no family history of colon cancer?_  :?no? dysuria, ?hematuria, ?frequency, urgency, ?incontinence,? testicular pain/swelling,?_?no family history of prostate cancer_  [facility]:? no? myalgia, ?weakness, edema,? arthralgia, or ?joint effusion  SKIN:? No?rash, hives,?itching or?sores  NEURO:??occasional ?headaches, no numbness, tingling,? weakness, or ?dizziness  PSYCH:? No anxiety, depression, ?irritability, ?suicidal ideation or hallucinations  ENDO:? No ?polyuria, ?polydipsia, ?polyphagia  HEME:? No Bruising, lymphadenopathy, bleeding disorders ?or?signs of anemia  Physical Exam  Vitals & Measurements  HR:?56(Peripheral)? BP:?128/72?  HT:?193.00?cm? WT:?134.000?kg? BMI:?35.97?  GENERAL:  NAD, alert and oriented x 3  SKIN:? no rash or abnormal appearing skin lesions  HEENT:? PERRLA, EOMI, mouth wnl, throat wnl, EAC and TM wnl bilaterally  NECK:? FROM, no lymphadenopathy, no thyroid abnormalities palpable  CHEST:? CTA bilaterally no wheezes, crackles or rubs  CARDIAC:? RRR, no murmurs audible  ABDOMEN:? Soft, nontender, nondistended, NBSx4,?no rebound or guarding, no HSM  EXTREMITIES:? no clubbing, cyanosis, or +1 R >L edema lower ext.? joints wnl. +2 DP/PT pulse bilaterally  NEURO:? no sensory or motor deficits noted. CN II-XII intact. Gait wnl.?  GENITAL: normal testes, no hernia  RECTAL: no hemorrhoids or fissures, prostate WNL  Assessment/Plan  1.?Wellness examination?Z00.00  ?CBC, CMP, FLP, TSH, PSA, U/A, A1C, Encourage pt to increase cardiovascular exercise and attempt to obtain at least 150 minutes of moderate aerobic exercise per week or 75 minutes of vigorous aerobic exercise weekly.  Ordered:  CBC w/ Auto Diff, Routine collect, 12/06/21 9:12:00 CST, Blood, Order for future visit, Stop date 12/06/21 9:12:00 CST, Lab Collect, Wellness examination, 12/06/21 9:12:00 CST  Comprehensive Metabolic Panel, Routine collect, 12/06/21 9:12:00 CST, Blood, Order for future visit, Stop date 12/06/21 9:12:00 CST, Lab Collect, Wellness examination  Diabetes mellitus  HTN - Hypertension  Hypercholesteremia, 12/06/21 9:12:00 CST  Hemoglobin A1c, Routine collect, 12/06/21 9:12:00 CST, Blood, Order for future visit, Stop date 12/06/21 9:12:00 CST, Lab Collect, Wellness examination  Diabetes mellitus, 12/06/21 9:12:00 CST  Lab Collection Request, 12/06/21 9:12:00 CST, HLINK AMB - AFP, 12/06/21 9:12:00 CST, Wellness examination  Lipid Panel, Routine collect, 12/06/21 9:12:00 CST, Blood, Order for future visit, Stop date 12/06/21 9:12:00 CST, Lab Collect, Wellness examination  Hypercholesteremia, 12/06/21 9:12:00 CST  [facility] Est 40-64 years [id number] PC, Wellness examination  Diabetes mellitus   HTN - Hypertension  Hypothyroid  Hypercholesteremia  Hx of pancreatitis, [facility]LINK AMB - AFP, 12/06/21 9:13:00 CST  Prostate Specific Antigen, Routine collect, 12/06/21 9:12:00 CST, Blood, Order for future visit, Stop date 12/06/21 9:12:00 CST, Lab Collect, Wellness examination  Prostate cancer screening, 12/06/21 9:12:00 CST  Thyroid Stimulating Hormone, Routine collect, 12/06/21 9:12:00 CST, Blood, Order for future visit, Stop date 12/06/21 9:12:00 CST, Lab Collect, Wellness examination  Hypothyroid, 12/06/21 9:12:00 CST  Urinalysis no Reflex, Routine collect, Urine, Order for future visit, 12/06/21 9:12:00 CST, Stop date 12/06/21 9:12:00 CST, Nurse collect, Wellness examination  HTN - Hypertension  ?  2.?Prostate cancer screening?Z12.5  ?PSA  Ordered:  Prostate Specific Antigen, Routine collect, 12/06/21 9:12:00 CST, Blood, Order for future visit, Stop date 12/06/21 9:12:00 CST, Lab Collect, Wellness examination  Prostate cancer screening, 12/06/21 9:12:00 CST  ?  3.?Encounter for immunization?Z23  decline ?flu shot, consider covid vaccine  ?  4.?Diabetes mellitus?E11.9  last A1C 9.0,?? continue Metformin?1000 bid, ?DM eye exam 12/29/2020, DM foot exam today? (had pancreatitis with Victoza)  Ordered:  Clinic Follow up, *Est. 06/06/22 3:00:00 CDT, Order for future visit, Diabetes mellitus  HTN - Hypertension  Hypercholesteremia  Hypothyroid  Hx of pancreatitis, HLink AFP  Comprehensive Metabolic Panel, Routine collect, 12/06/21 9:12:00 CST, Blood, Order for future visit, Stop date 12/06/21 9:12:00 CST, Lab Collect, Wellness examination  Diabetes mellitus  HTN - Hypertension  Hypercholesteremia, 12/06/21 9:12:00 CST  Hemoglobin A1c, Routine collect, 12/06/21 9:12:00 CST, Blood, Order for future visit, Stop date 12/06/21 9:12:00 CST, Lab Collect, Wellness examination  Diabetes mellitus, 12/06/21 9:12:00 CST  Preventative Health Care Est 40-64 years [id number] PC, Wellness examination  Diabetes mellitus  HTN  - Hypertension  Hypothyroid  Hypercholesteremia  Hx of pancreatitis, [facility]LINK AMB - AFP, 12/06/21 9:13:00 CST  ?  5.?HTN - Hypertension?I10  ?continue Coreg 6.25 bid, and Lisinopril hct 20/25  Ordered:  Clinic Follow up, *Est. 06/06/22 3:00:00 CDT, Order for future visit, Diabetes mellitus  HTN - Hypertension  Hypercholesteremia  Hypothyroid  Hx of pancreatitis, [facility]Link AFP  Comprehensive Metabolic Panel, Routine collect, 12/06/21 9:12:00 CST, Blood, Order for future visit, Stop date 12/06/21 9:12:00 CST, Lab Collect, Wellness examination  Diabetes mellitus  HTN - Hypertension  Hypercholesteremia, 12/06/21 9:12:00 CST  Preventative Health Care Est 40-64 years [id number] PC, Wellness examination  Diabetes mellitus  HTN - Hypertension  Hypothyroid  Hypercholesteremia  Hx of pancreatitis, HLINK AMB - AFP, 12/06/21 9:13:00 CST  Urinalysis no Reflex, Routine collect, Urine, Order for future visit, 12/06/21 9:12:00 CST, Stop date 12/06/21 9:12:00 CST, Nurse collect, Wellness examination  HTN - Hypertension  ?  6.?Hypercholesteremia?E78.00  ?continue Crestor 10 mg  Ordered:  Clinic Follow up, *Est. 06/06/22 3:00:00 CDT, Order for future visit, Diabetes mellitus  HTN - Hypertension  Hypercholesteremia  Hypothyroid  Hx of pancreatitis, [facility]Link AFP  Comprehensive Metabolic Panel, Routine collect, 12/06/21 9:12:00 CST, Blood, Order for future visit, Stop date 12/06/21 9:12:00 CST, Lab Collect, Wellness examination  Diabetes mellitus  HTN - Hypertension  Hypercholesteremia, 12/06/21 9:12:00 CST  Lipid Panel, Routine collect, 12/06/21 9:12:00 CST, Blood, Order for future visit, Stop date 12/06/21 9:12:00 CST, Lab Collect, Wellness examination  Hypercholesteremia, 12/06/21 9:12:00 CST  Preventative Health Care Est 40-64 years [id number] PC, Wellness examination  Diabetes mellitus  HTN - Hypertension  Hypothyroid  Hypercholesteremia  Hx of pancreatitis, [facility] AMB - AFP, 12/06/21 9:13:00  CST  ?  7.?Hypothyroid?E03.9  ?check TSH, continue Levothyroxine 175 mcg  Ordered:  Clinic Follow up, *Est. 06/06/22 3:00:00 CDT, Order for future visit, Diabetes mellitus  HTN - Hypertension  Hypercholesteremia  Hypothyroid  Hx of pancreatitis, [facility] Health Care Est 40-64 years [id number] PC, Wellness examination  Diabetes mellitus  HTN - Hypertension  Hypothyroid  Hypercholesteremia  Hx of pancreatitis, [facility] AMB - AFP, 12/06/21 9:13:00 CST  Thyroid Stimulating Hormone, Routine collect, 12/06/21 9:12:00 CST, Blood, Order for future visit, Stop date 12/06/21 9:12:00 CST, Lab Collect, Wellness examination  Hypothyroid, 12/06/21 9:12:00 CST  ?  8.?Hx of pancreatitis?Z87.19  ?avoid ETOH and certain DM meds--with victoza  Ordered:  Clinic Follow up, *Est. 06/06/22 3:00:00 CDT, Order for future visit, Diabetes mellitus  HTN - Hypertension  Hypercholesteremia  Hypothyroid  Hx of pancreatitis, [facility] Health Care Est 40-64 years [id number] PC, Wellness examination  Diabetes mellitus  HTN - Hypertension  Hypothyroid  Hypercholesteremia  Hx of pancreatitis, [facility] AMB - AFP, 12/06/21 9:13:00 CST  ?  Referrals  Clinic Follow up, *Est. 06/06/22 3:00:00 CDT, Order for future visit, Diabetes mellitus  HTN - Hypertension  Hypercholesteremia  Hypothyroid  Hx of pancreatitis, [facility]   Problem List/Past Medical History  Ongoing  AR (allergic rhinitis)  Cervical disc disease  Diabetes mellitus  HTN - Hypertension  Hypercholesteremia  Hypothyroid  Left shoulder pain  Obstructive sleep apnea of adult  Preop examination  Historical  Acute pancreatitis  right Cellulitis of lower leg  Procedure/Surgical History  Back surgery-microdiscectomy on l-3 and l-4 (06/01/2018)  Cervical disc surgery (06.2015)  Dentures   Medications  carvedilol 6.25 mg oral tablet, See Instructions  hydrochlorothiazide-lisinopril 25 mg-20 mg oral tablet, See Instructions  levothyroxine 175 mcg (0.175 mg) oral  tablet, See Instructions  metformin 500 mg oral tablet, 1000 mg= 2 tab(s), Oral, BID  Misc Prescription, See Instructions  One touch Verio Test strips, See Instructions, 11 refills  rosuvastatin 10 mg oral tablet, See Instructions  Allergies  Victoza?(Pancreatitis)  Social History  Abuse/Neglect  No, 12/06/2021  No, 08/25/2021  No, 07/02/2021  No, 06/25/2021  No, 04/09/2021  No, 12/03/2020  No, 05/13/2020  No, 10/07/2019  No, 07/31/2019  Alcohol  Past, 01/28/2018  Employment/School  Employed, Work/School description: ., 01/28/2018  Home/Environment  Lives with Spouse., 03/12/2018  Tobacco  Former smoker, quit more than 30 days ago, No, 12/06/2021  Former smoker, quit more than 30 days ago, No, 08/25/2021  Former smoker, quit more than 30 days ago, No, 07/02/2021  Former smoker, quit more than 30 days ago, No, 06/25/2021  Former smoker, quit more than 30 days ago, N/A, 04/09/2021  Former smoker, quit more than 30 days ago, N/A, 12/03/2020  Former smoker, quit more than 30 days ago, N/A, 05/13/2020  Former smoker, quit more than 30 days ago, N/A, 10/07/2019  Former smoker, quit more than 30 days ago, N/A, 07/31/2019  Former smoker, quit more than 30 days ago, N/A, 04/04/2019  Former smoker, N/A, 12/03/2018  Former smoker, N/A, 12/02/2018  Former smoker, Cigarettes, 01/28/2018  Family History  Diabetes mellitus: Mother and Brother.  Hypertension.: Mother.  Lower back injury: Brother.Negative: Sister.  Neuropathy: Brother.  Father: History is unknown  Immunizations  Vaccine Date Status Comments   pneumococcal 23-polyvalent vaccine 08/28/2018 Given    tetanus/diphth/pertuss (Tdap) adult/adol 2014 Recorded    diphtheria/pertussis, acellular/tetanus 2014 Recorded hospital   Health Maintenance  Health Maintenance  ???Pending?(in the next year)  ??? ??OverDue  ??? ? ? ?Alcohol Misuse Screening due??01/02/21??and every 1??year(s)  ??? ??Due?  ??? ? ? ?ADL Screening due??12/06/21??and every 1??year(s)  ???  ? ? ?Depression Screening due??12/06/21??Unknown Frequency  ??? ? ? ?Hypertension Management-Education due??12/06/21??and every 1??year(s)  ??? ??Due In Future?  ??? ? ? ?Diabetes Maintenance-Eye Exam not due until??12/29/21??and every 1??year(s)  ??? ? ? ?Obesity Screening not due until??01/01/22??and every 1??year(s)  ??? ? ? ?Diabetes Maintenance-Fasting Lipid Profile not due until??04/05/22??and every 1??year(s)  ??? ? ? ?Diabetes Maintenance-HgbA1c not due until??08/25/22??and every 1??year(s)  ??? ? ? ?Hypertension Management-BMP not due until??08/25/22??and every 1??year(s)  ??? ? ? ?Diabetes Maintenance-Serum Creatinine not due until??08/25/22??and every 1??year(s)  ???Satisfied?(in the past 1 year)  ??? ??Satisfied?  ??? ? ? ?Blood Pressure Screening on??12/06/21.??Satisfied by Mary Beth Bee CMA.  ??? ? ? ?Body Mass Index Check on??12/06/21.??Satisfied by Mary Beth Bee CMA.  ??? ? ? ?Diabetes Maintenance-Eye Exam on??12/29/20.??Satisfied by Kaycee Benitez  ??? ? ? ?Diabetes Maintenance-Fasting Lipid Profile on??04/05/21.??Satisfied by Simón Madrid  ??? ? ? ?Diabetes Maintenance-Foot Exam on??12/06/21.??Satisfied by Jesse Pandey MD  ??? ? ? ?Diabetes Maintenance-HgbA1c on??08/25/21.??Satisfied by Simón Madrid  ??? ? ? ?Diabetes Maintenance-Serum Creatinine on??08/25/21.??Satisfied by Simón Madrid  ??? ? ? ?Diabetes Screening on??08/25/21.??Satisfied by Simón Madrid  ??? ? ? ?Hypertension Management-Blood Pressure on??12/06/21.??Satisfied by Mary Beth Bee CMA  ??? ? ? ?Hypertension Management-BMP on??08/25/21.??Satisfied by Simón Madrid  ??? ? ? ?Influenza Vaccine on??12/06/21.??Satisfied by Mary Beth Bee CMA  ??? ? ? ?Lipid Screening on??04/05/21.??Satisfied by Simón Madrid  ??? ? ? ?Obesity Screening on??12/06/21.??Satisfied by Mary Beth Bee CMA  ?

## 2022-05-03 NOTE — HISTORICAL [facility] CERNER
This is a historical note converted from Fredo. Formatting and pictures may have been removed.  Please reference Fredo for original formatting and attached multimedia. Chief Complaint  6 MTH RCK DM  History of Present Illness  Patient is here today for 6 month recheck diabetes he cancel?his follow-up in October and again in?November?and is here today?which is almost 11 months?since his last visit.? He does have a 15 pound weight gain but has been doing a new diet recently that his insurance has approved?that has him?connected with a diet . ?He is started exercising regularly?as well as counting his calories.? He is tolerating all medications without side effects.? He did have a diabetic eye exam?3-4 weeks ago that was reported as negative.? We will do a diabetic foot exam today.  Review of Systems  General:???+ ?weightgain?15lbs  HEENT:???novision changes,? dm eye exam?less than 1 yr ago(LESS THAN 1 MO AGO)  CARDIAC:?no?chest pain, SOB,  GI:?no?diarrhea,?no?n&v  ENDOCRINE:?nopolyuria,no?polydipsia,?no?polyphagia  EXT:?no?signs of neuropathy  Physical Exam  Vitals & Measurements  HR:?66(Peripheral)? BP:?140/90?  HT:?190.5?cm? HT:?190.5?cm? WT:?153?kg? WT:?153?kg? BMI:?42.16?  GENERAL:?? alert and oriented x4  HEENT:? PERRLA, mouth and throat clear, mucous membranes moist  CHEST:? CTA bilaterally  CARDIAC:? RRR no murmurs audible  EXT:?no?edema to lower extremitiesbilaterally,?  Assessment/Plan  1.?Diabetes mellitus  DECLINE PNEUMOVAX TODAY?.? Patient will continue his current diet and exercise regimen. ?He will try to improve his follow-ups?for his diabetic office visit.? We discussed giving the Pneumovax however he declines is not interested in it at this time. ?He is up-to-date on his diabetic eye exam.? We will check his A1c CMP fasting lipid?TSH and a microalbumin.  Ordered:  Clinic Follow up, *Est. 09/12/18 3:00:00 CDT, AS cpx/DM RECHECK, Order for future visit, Diabetes mellitus  HTN - Hypertension   Hypercholesteremia  Hypothyroid, HLink AFP  CMP, Routine collect, 03/12/18 9:07:00 CDT, Blood, Order for future visit, Stop date 03/12/18 9:07:00 CDT, Lab Collect, Diabetes mellitus, 03/12/18 9:07:00 CDT  Hemoglobin A1c, Routine collect, 03/12/18 9:07:00 CDT, Blood, Order for future visit, Stop date 03/12/18 9:07:00 CDT, Lab Collect, Diabetes mellitus, 03/12/18 9:07:00 CDT  Lab Collection Request, 03/12/18 9:07:00 CDT, HLINK AMB - AFP, 03/12/18 9:07:00 CDT  Microalbumin Urine, Routine collect, Urine, Order for future visit, 03/12/18 9:07:00 CDT, Stop date 03/12/18 9:07:00 CDT, Nurse collect, Diabetes mellitus  Office/Outpatient Visit Level 4 Established [id number] PC, Diabetes mellitus  HTN - Hypertension  Hypothyroid  Hypercholesteremia, [facility]LINK AMB - AFP, 03/12/18 9:07:00 CDT  ?  2.?HTN - Hypertension  ?Currently controlled on lisinopril 20 mg.  Ordered:  Clinic Follow up, *Est. 09/12/18 3:00:00 CDT, AS cpx/DM RECHECK, Order for future visit, Diabetes mellitus  HTN - Hypertension  Hypercholesteremia  Hypothyroid, HLink AFP  Office/Outpatient Visit Level 4 Established [id number] PC, Diabetes mellitus  HTN - Hypertension  Hypothyroid  Hypercholesteremia, [facility]LINK AMB - AFP, 03/12/18 9:07:00 CDT  ?  3.?Hypothyroid  ?Recheck TSH today especially with a 15 pound weight gain.  Ordered:  Clinic Follow up, *Est. 09/12/18 3:00:00 CDT, AS cpx/DM RECHECK, Order for future visit, Diabetes mellitus  HTN - Hypertension  Hypercholesteremia  Hypothyroid, HLink AFP  Office/Outpatient Visit Level 4 Established [id number] PC, Diabetes mellitus  HTN - Hypertension  Hypothyroid  Hypercholesteremia, HLINK AMB - AFP, 03/12/18 9:07:00 CDT  Thyroid Stimulating Hormone, Routine collect, 03/12/18 9:07:00 CDT, Blood, Order for future visit, Stop date 03/12/18 9:07:00 CDT, Lab Collect, Hypothyroid, 03/12/18 9:07:00 CDT  ?  4.?Hypercholesteremia  ?Continue simvastatin 10 mg, plus diet and exercise.  Ordered:  Clinic Follow up, *Est. 09/12/18  3:00:00 CDT, AS cpx/DM RECHECK, Order for future visit, Diabetes mellitus  HTN - Hypertension  Hypercholesteremia  Hypothyroid, HLink AFP  Lipid Panel, Routine collect, 03/12/18 9:07:00 CDT, Blood, Order for future visit, Stop date 03/12/18 9:07:00 CDT, Lab Collect, Hypercholesteremia, 03/12/18 9:07:00 CDT  Office/Outpatient Visit Level 4 Established [id number] PC, Diabetes mellitus  HTN - Hypertension  Hypothyroid  Hypercholesteremia, HLINK AMB - AFP, 03/12/18 9:07:00 CDT  ?   Problem List/Past Medical History  Ongoing  Cervical disc disease  Diabetes mellitus  HTN - Hypertension  Hypercholesteremia  Hypothyroid  Obstructive sleep apnea of adult  Historical  Acute pancreatitis  right Cellulitis of lower leg  Procedure/Surgical History  Cervical disc surgery (06/2015), Dentures.  Medications  aspirin 81 mg oral tablet, 81 mg= 1 tab(s), Oral, Daily  glyburide-metformin 5 mg-500 mg oral tablet, 1 tab(s), Oral, At Bedtime  LEVOTHYROXINE SODIUM 150 MCG TABS, 150 mcg= 1 tab(s), Oral, Daily  lisinopril 40 mg oral tablet, 40 mg= 1 tab(s), Oral, Daily  metformin 500 mg oral tablet, 500 mg= 1 tab(s), Oral, Daily  OMEPRAZOLE 20 MG CPDR, 20 mg= 1 cap(s), Oral, Daily  SIMVASTATIN 10 MG TABS  Allergies  No Known Medication Allergies  Social History  Alcohol  Past, 01/28/2018  Employment/School  Employed, Work/School description: ., 01/28/2018  Home/Environment  Lives with Spouse., 03/12/2018  Tobacco  Former smoker, Cigarettes, 01/28/2018  Family History  Diabetes mellitus: Mother and Brother.  Hypertension.: Mother.  Lower back injury: Brother.Negative: Sister.  Neuropathy: Brother.  Immunizations  Vaccine Date Status Comments   diphtheria/pertussis, acellular/tetanus 2014 Recorded hospital

## 2022-05-03 NOTE — HISTORICAL [facility] CERNER
This is a historical note converted from Fredo. Formatting and pictures may have been removed.  Please reference Fredo for original formatting and attached multimedia. Chief Complaint  [facility]/FASTING  History of Present Illness  Patient is here today for 6-month recheck?diabetes/hypertension/hypothyroid/hypercholesterolemia.? He is tolerating all medications without side effects.? He is exercising by doing cardio?every morning?for 30 minutes.? His last diabetic eye exam on record was April 2019?but patient feels he did 1?since then and will try to?have a copy sent for the chart.? We discussed changing?his simvastatin 10 mg to Crestor 10 mg?to get closer to?high-dose statin that is indicated for diabetics.? He has not had any problems with statins in the past.  ?  He also has several warts on his left hand. ?He already tried over-the-counter medications without any?relief. ?He would like to have them treated today.  Review of Systems  General:???+ ?weightloss?11lbs ?_  HEENT:???novision changes,? dm eye exam?greater than 1 year ago? 4/2019  CARDIAC:?no?chest pain, SOB,  GI:?no?diarrhea,?no?n&v  ENDOCRINE:?nopolyuria,no?polydipsia,?no?polyphagia  EXT:?no?signs of neuropathy  Skin:?Several warts on left hand, failed over-the-counter treatment  Physical Exam  Vitals & Measurements  T:?36.8? ?C (Oral)? HR:?74(Peripheral)? BP:?140/76?  HT:?193?cm? WT:?148.1?kg? BMI:?39.76?  GENERAL:?? alert and oriented x4  HEENT:? PERRLA, mouth and throat clear, mucous membranes moist  CHEST:? CTA bilaterally  CARDIAC:? RRR no murmurs audible  EXT:?+1? ?chronic?edema to lower extremities? ?bilaterally,?diabetic foot exam today, see form  Left hand:?Middle finger with?warts near nail?x2,?one on extensor surface?proximal phalanx,?and another?extensor surface?proximal phalanx?of the ring finger?and palmar surface.  All warts cryo today?for approximately 20 to 25 seconds each. ?Wound care?discussed.? Procedure well tolerated?although  painful  Assessment/Plan  1.?Diabetes mellitus?E11.9  ?Last A1C 7.6, continue Glyburide/Met 5/500 2 po bid, Hx pancreatitis, on ACE, ?DM eye exam 4/2019 overdue, DM foot exam today  Ordered:  Clinic Follow up, *Est. 11/13/20 3:00:00 CST, Order for future visit, Wellness examination  Diabetes mellitus  HTN - Hypertension, HLink AFP  Comprehensive Metabolic Panel, Routine collect, 05/13/20 11:05:00 CDT, Blood, Stop date 05/13/20 11:05:00 CDT, Lab Collect, Diabetes mellitus  HTN - Hypertension  Hypercholesteremia, 05/13/20 11:05:00 CDT  Hemoglobin A1c, Routine collect, 05/13/20 11:05:00 CDT, Blood, Stop date 05/13/20 11:05:00 CDT, Lab Collect, Diabetes mellitus, 05/13/20 11:05:00 CDT  Office/Outpatient Visit Level 4 Established [id number] PC, Diabetes mellitus  HTN - Hypertension  Hypercholesteremia  Hypothyroid  Common wart, HLINK AMB - AFP, 05/13/20 11:02:00 CDT  ?  2.?HTN - Hypertension?I10  continue coreg 6.25 bid and Lisinopril hct 20/25  Ordered:  Clinic Follow up, *Est. 11/13/20 3:00:00 CST, Order for future visit, Wellness examination  Diabetes mellitus  HTN - Hypertension, HLink AFP  Comprehensive Metabolic Panel, Routine collect, 05/13/20 11:05:00 CDT, Blood, Stop date 05/13/20 11:05:00 CDT, Lab Collect, Diabetes mellitus  HTN - Hypertension  Hypercholesteremia, 05/13/20 11:05:00 CDT  Office/Outpatient Visit Level 4 Established 89127 PC, Diabetes mellitus  HTN - Hypertension  Hypercholesteremia  Hypothyroid  Common wart, HLINK AMB - AFP, 05/13/20 11:02:00 CDT  ?  3.?Hypercholesteremia?E78.00  ?d/c ?simvastatin,?begin ?crestor 10 mg (closer to high dose statin)  Ordered:  Comprehensive Metabolic Panel, Routine collect, 05/13/20 11:05:00 CDT, Blood, Stop date 05/13/20 11:05:00 CDT, Lab Collect, Diabetes mellitus  HTN - Hypertension  Hypercholesteremia, 05/13/20 11:05:00 CDT  Lipid Panel, Routine collect, 05/13/20 11:05:00 CDT, Blood, Stop date 05/13/20 11:05:00 CDT, Lab Collect, Hypercholesteremia,  05/13/20 11:05:00 CDT  Office/Outpatient Visit Level 4 Established 75831 PC, Diabetes mellitus  HTN - Hypertension  Hypercholesteremia  Hypothyroid  Common wart, HLINK AMB - AFP, 05/13/20 11:02:00 CDT  ?  4.?Hypothyroid?E03.9  ?check TSH, continue Levothyroxine  Ordered:  Office/Outpatient Visit Level 4 Established 44958 PC, Diabetes mellitus  HTN - Hypertension  Hypercholesteremia  Hypothyroid  Common wart, HLINK AMB - AFP, 05/13/20 11:02:00 CDT  Thyroid Stimulating Hormone, Routine collect, 05/13/20 11:05:00 CDT, Blood, Stop date 05/13/20 11:05:00 CDT, Lab Collect, Hypothyroid, 05/13/20 11:05:00 CDT  ?  5.?Common wart?B07.8  ?cryo left hand warts x 5  Ordered:  Lesion 1-14 Benign - Destruction [id number] PC, 05/13/20 11:02:00 CDT, HLINK AMB - AFP, 05/13/20 11:02:00 CDT, Common wart  Office/Outpatient Visit Level 4 Established 75406 PC, Diabetes mellitus  HTN - Hypertension  Hypercholesteremia  Hypothyroid  Common wart, HLINK AMB - AFP, 05/13/20 11:02:00 CDT  ?  Orders:  rosuvastatin, 10 mg = 1 tab(s), Oral, Daily, # 90 tab(s), 3 Refill(s), Pharmacy: [facility] [id number] [facility], 193, cm, Height/Length Dosing, 05/13/20 10:27:00 CDT, 148.1, kg, Weight Dosing, 05/13/20 10:27:00 CDT  -CMP,, FLP, A1C , TSH  Referrals  Clinic Follow up, *Est. 11/13/20 3:00:00 CST, Order for future visit, Wellness examination  Diabetes mellitus  HTN - Hypertension, HLink AFP  Clinic Follow up, Order for future visit   Problem List/Past Medical History  Ongoing  AR (allergic rhinitis)  Cervical disc disease  Diabetes mellitus  HTN - Hypertension  Hypercholesteremia  Hypothyroid  Left shoulder pain  Obstructive sleep apnea of adult  Preop examination  Historical  Acute pancreatitis  right Cellulitis of lower leg  Procedure/Surgical History  Back surgery-microdiscectomy on l-3 and l-4 (06/01/2018)  Cervical disc surgery (06.2015)  Dentures   Medications  carvedilol 6.25 mg oral tablet, See Instructions  Crestor 10 mg oral tablet, 10 mg= 1  tab(s), Oral, Daily, 3 refills  glyburide-metformin 5 mg-500 mg oral tablet, See Instructions, 5 refills  hydrochlorothiazide-lisinopril 25 mg-20 mg oral tablet, See Instructions  levothyroxine 150 mcg (0.15 mg) oral tablet, See Instructions, 5 refills  One touch Verio Test strips, See Instructions, 11 refills  Allergies  No Known Medication Allergies  Social History  Abuse/Neglect  No, 05/13/2020  No, 10/07/2019  No, 07/31/2019  Alcohol  Past, 01/28/2018  Employment/School  Employed, Work/School description: ., 01/28/2018  Home/Environment  Lives with Spouse., 03/12/2018  Tobacco  Former smoker, quit more than 30 days ago, N/A, 05/13/2020  Former smoker, quit more than 30 days ago, N/A, 10/07/2019  Former smoker, quit more than 30 days ago, N/A, 07/31/2019  Former smoker, quit more than 30 days ago, N/A, 04/04/2019  Former smoker, N/A, 12/03/2018  Former smoker, N/A, 12/02/2018  Former smoker, Cigarettes, 01/28/2018  Family History  Diabetes mellitus: Mother and Brother.  Hypertension.: Mother.  Lower back injury: Brother.Negative: Sister.  Neuropathy: Brother.  Father: History is unknown  Immunizations  Vaccine Date Status Comments   pneumococcal 23-polyvalent vaccine 08/28/2018 Given    tetanus/diphth/pertuss (Tdap) adult/adol 2014 Recorded    diphtheria/pertussis, acellular/tetanus 2014 Recorded hospital   Health Maintenance  Health Maintenance  ???Pending?(in the next year)  ??? ??OverDue  ??? ? ? ?Alcohol Misuse Screening due??01/01/20??and every 1??year(s)  ??? ? ? ?Diabetes Maintenance-Eye Exam due??04/10/20??and every 1??year(s)  ??? ??Due?  ??? ? ? ?ADL Screening due??05/13/20??and every 1??year(s)  ??? ? ? ?Depression Screening due??05/13/20??and every?  ??? ? ? ?Hypertension Management-Education due??05/13/20??and every 1??year(s)  ??? ??Due In Future?  ??? ? ? ?Diabetes Maintenance-HgbA1c not due until??10/06/20??and every 1??year(s)  ??? ? ? ?Diabetes Maintenance-Urine Dipstick not  due until??10/07/20??and every 1??year(s)  ??? ? ? ?Diabetes Maintenance-Medication Prescribed not due until??11/15/20??and every 1??year(s)  ??? ? ? ?Obesity Screening not due until??01/01/21??and every 1??year(s)  ???Satisfied?(in the past 1 year)  ??? ??Satisfied?  ??? ? ? ?Blood Pressure Screening on??05/13/20.??Satisfied by Cristal LIMON Mary Beth L.  ??? ? ? ?Body Mass Index Check on??05/13/20.??Satisfied by William Bee CMAri L.  ??? ? ? ?Diabetes Maintenance-Fasting Lipid Profile on??05/13/20.??Satisfied by Simón Madrid  ??? ? ? ?Diabetes Maintenance-Foot Exam on??05/13/20.??Satisfied by Fred Pandey MD  ??? ? ? ?Diabetes Maintenance-HgbA1c on??10/07/19.??Satisfied by Simón Madrid  ??? ? ? ?Diabetes Maintenance-Medication Prescribed on??11/15/19.??Satisfied by Fred Pandey MD  ??? ? ? ?Diabetes Maintenance-Serum Creatinine on??05/13/20.??Satisfied by Simón Madrid  ??? ? ? ?Diabetes Maintenance-Urine Dipstick on??10/07/19.??Satisfied by Simón Madrid  ??? ? ? ?Diabetes Screening on??05/13/20.??Satisfied by Simón Madrid  ??? ? ? ?Hypertension Management-BMP on??05/13/20.??Satisfied by Simón Madrid  ??? ? ? ?Hypertension Management-Blood Pressure on??05/13/20.??Satisfied by Cristal LIMON Mary Beth L.  ??? ? ? ?Influenza Vaccine on??10/07/19.??Satisfied by Cristal LIMON Mary Beth L.  ??? ? ? ?Lipid Screening on??05/13/20.??Satisfied by Simón Madrid  ??? ? ? ?Obesity Screening on??05/13/20.??Satisfied by Cristal LIMON Mary Beth L.  ?
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
## 2022-05-03 NOTE — HISTORICAL [facility] CERNER
This is a historical note converted from Fredo. Formatting and pictures may have been removed.  Please reference Fredo for original formatting and attached multimedia. Chief Complaint  [facility]/FASTING  History of Present Illness  Patient is here today for 6-month recheck diabetes,?hypertension, hypercholesterolemia, and hypothyroidism. ?He is tolerating all medications without side effects.? He reports that his CBGs have been ranging between 85 and 130.? He is starting to increase his exercise regimen again since having back surgery.? He does report having nasal congestion for the past couple of weeks. ?No fever. ?No sick contacts.? He thinks he could possibly be allergies. ?He is tried over-the-counter Coricidin BP?with minimal improvement.? His diabetic eye exam is due?and his wife?scheduled him?an appointment?for next month.  Review of Systems  General:???+ ?weightloss?2lbs  HEENT:???novision changes,? dm eye exam?less than 1 yr ago, + clear runny nose x 4 weeks  CARDIAC:?no?chest pain, SOB,  GI:?no?diarrhea,?no?n&v  ENDOCRINE:?nopolyuria,no?polydipsia,?no?polyphagia  EXT:?no?signs of neuropathy, chronic venous stasis--has compression stockings at home  Physical Exam  Vitals & Measurements  HR:?64(Peripheral)? BP:?140/68?  HT:?193?cm? WT:?153?kg? BMI:?41.07?  GENERAL:?? alert and oriented x4  HEENT:? PERRLA, mouth and throat clear, mucous membranes moist, +edema to turbinates bilateral  CHEST:? CTA bilaterally  CARDIAC:? RRR no murmurs audible  EXT:?+1? ?edema to lower extremities? ?bilaterally,?(chronic venous stasis changes pretibial B), Dm foot exam today  Assessment/Plan  1.?Diabetes mellitus  ?Last A1C 6.7,?check TSH, CMP, A1C, FLP--?continue glyburide/metformin 5/500 2 po bid, ON ACE,? Dm foot exam today, Dm Eye exam --get report--has upcoming apt with Dr Montalvo [facility]  Ordered:  Clinic Follow up, *Est. 10/04/19 3:00:00 CDT, Order for future visit, Wellness examination  Diabetes mellitus, [facility]  AFP  Comprehensive Metabolic Panel, Routine collect, 04/04/19 8:51:00 CDT, Blood, Order for future visit, Stop date 04/04/19 8:51:00 CDT, Lab Collect, Diabetes mellitus  HTN - Hypertension  Hypercholesteremia, 04/04/19 8:51:00 CDT  Hemoglobin A1c, Routine collect, 04/04/19 8:51:00 CDT, Blood, Order for future visit, Stop date 04/04/19 8:51:00 CDT, Lab Collect, Diabetes mellitus, 04/04/19 8:51:00 CDT  Lab Collection Request, 04/04/19 8:51:00 CDT, [facility] AMB - AFP, 04/04/19 8:51:00 CDT  Office/Outpatient Visit Level 4 Established 73782 PC, Diabetes mellitus  HTN - Hypertension  Hypercholesteremia  Hypothyroid  AR (allergic rhinitis), [facility] AMB - AFP, 04/04/19 8:52:00 CDT  ?  2.?HTN - Hypertension  ?continue Lisinopril 20 mg  Ordered:  Comprehensive Metabolic Panel, Routine collect, 04/04/19 8:51:00 CDT, Blood, Order for future visit, Stop date 04/04/19 8:51:00 CDT, Lab Collect, Diabetes mellitus  HTN - Hypertension  Hypercholesteremia, 04/04/19 8:51:00 CDT  Office/Outpatient Visit Level 4 Established 11294 PC, Diabetes mellitus  HTN - Hypertension  Hypercholesteremia  Hypothyroid  AR (allergic rhinitis), [facility] AM[facility] - AFP, 04/04/19 8:52:00 CDT  ?  3.?Hypercholesteremia  ?continue simvastatin 10 mg  Ordered:  Comprehensive Metabolic Panel, Routine collect, 04/04/19 8:51:00 CDT, Blood, Order for future visit, Stop date 04/04/19 8:51:00 CDT, Lab Collect, Diabetes mellitus  HTN - Hypertension  Hypercholesteremia, 04/04/19 8:51:00 CDT  Lipid Panel, Routine collect, 04/04/19 8:51:00 CDT, Blood, Order for future visit, Stop date 04/04/19 8:51:00 CDT, Lab Collect, Hypercholesteremia, 04/04/19 8:51:00 CDT  Office/Outpatient Visit Level 4 Established 18957 PC, Diabetes mellitus  HTN - Hypertension  Hypercholesteremia  Hypothyroid  AR (allergic rhinitis), [facility] AMB - AFP, 04/04/19 8:52:00 CDT  ?  4.?Hypothyroid  Check ?TSH, continue levothyroxine 150 mcg  Ordered:  Office/Outpatient Visit Level 4 Established 24981  PC, Diabetes mellitus  HTN - Hypertension  Hypercholesteremia  Hypothyroid  AR (allergic rhinitis), [facility] AMB - AFP, 04/04/19 8:52:00 CDT  Thyroid Stimulating Hormone, Routine collect, 04/04/19 8:51:00 CDT, Blood, Order for future visit, Stop date 04/04/19 8:51:00 CDT, Lab Collect, Hypothyroid, 04/04/19 8:51:00 CDT  ?  5.?AR (allergic rhinitis)  Begin otc xyzal and flonase  Ordered:  Office/Outpatient Visit Level 4 Established 63470 PC, Diabetes mellitus  HTN - Hypertension  Hypercholesteremia  Hypothyroid  AR (allergic rhinitis), [facility] AMB - AFP, 04/04/19 8:52:00 CDT  ?  3X   Problem List/Past Medical History  Ongoing  AR (allergic rhinitis)  Cervical disc disease  Diabetes mellitus  HTN - Hypertension  Hypercholesteremia  Hypothyroid  Obstructive sleep apnea of adult  Preop examination  Historical  Acute pancreatitis  right Cellulitis of lower leg  Procedure/Surgical History  Back surgery-microdiscectomy on l-3 and l-4 (06/01/2018)  Cervical disc surgery (06.2015)  Dentures   Medications  glyburide-metformin 5 mg-500 mg oral tablet, 2 tab(s), Oral, BID, 1 refills  levothyroxine 150 mcg (0.15 mg) oral tablet, See Instructions, 1 refills  lisinopril 20 mg oral tablet, See Instructions, 6 refills  ONE TOUCH VERIO TEST STRIP, See Instructions, 11 refills  simvastatin 10 mg oral tablet, See Instructions, 1 refills  Allergies  No Known Medication Allergies  Social History  Alcohol  Past, 01/28/2018  Employment/School  Employed, Work/School description: ., 01/28/2018  Home/Environment  Lives with Spouse., 03/12/2018  Tobacco  Former smoker, quit more than 30 days ago, N/A, 04/04/2019  Former smoker, N/A, 12/03/2018  Former smoker, N/A, 12/02/2018  Former smoker, Cigarettes, 01/28/2018  Family History  Diabetes mellitus: Mother and Brother.  Hypertension.: Mother.  Lower back injury: Brother.Negative: Sister.  Neuropathy: Brother.  Father: History is unknown  Immunizations  Vaccine Date Status  Comments   pneumococcal 23-polyvalent vaccine 08/28/2018 Given    tetanus/diphth/pertuss (Tdap) adult/adol 2014 Recorded    diphtheria/pertussis, acellular/tetanus 2014 Recorded hospital   Health Maintenance  Health Maintenance  ???Pending?(in the next year)  ??? ??Due?  ??? ? ? ?ADL Screening due??04/04/19??and every 1??year(s)  ??? ? ? ?Depression Screening due??04/04/19??and every?  ??? ? ? ?Diabetes Maintenance-Eye Exam due??04/04/19??and every?  ??? ? ? ?Hypertension Management-Education due??04/04/19??and every 1??year(s)  ??? ??Due In Future?  ??? ? ? ?Diabetes Maintenance-Fasting Lipid Profile not due until??08/28/19??and every 1??year(s)  ??? ? ? ?Diabetes Maintenance-HgbA1c not due until??08/28/19??and every 1??year(s)  ??? ? ? ?Diabetes Maintenance-Microalbumin not due until??08/28/19??and every 1??year(s)  ??? ? ? ?Diabetes Maintenance-Serum Creatinine not due until??12/02/19??and every 1??year(s)  ??? ? ? ?Diabetes Maintenance-Urine Dipstick not due until??12/02/19??and every 1??year(s)  ??? ? ? ?Hypertension Management-BMP not due until??12/03/19??and every 1??year(s)  ??? ? ? ?Blood Pressure Screening not due until??04/03/20??and every 1??year(s)  ??? ? ? ?Body Mass Index Check not due until??04/03/20??and every 1??year(s)  ??? ? ? ?Diabetes Maintenance-Foot Exam not due until??04/03/20??and every 1??year(s)  ??? ? ? ?Hypertension Management-Blood Pressure not due until??04/03/20??and every 1??year(s)  ???Satisfied?(in the past 1 year)  ??? ??Satisfied?  ??? ? ? ?Blood Pressure Screening on??04/04/19.??Satisfied by Mary Beth Bee CMA  ??? ? ? ?Body Mass Index Check on??04/04/19.??Satisfied by Mary Beth Bee CMA  ??? ? ? ?Diabetes Maintenance-Fasting Lipid Profile on??08/28/18.??Satisfied by Simón Madrid  ??? ? ? ?Diabetes Maintenance-Foot Exam on??04/04/19.??Satisfied by Fred Pandey MD  ??? ? ? ?Diabetes Maintenance-HgbA1c on??08/28/18.??Satisfied by Ivelisse Bee  ??? ? ? ?Diabetes  Maintenance-Microalbumin on??08/28/18.??Satisfied by Simón Madrid  ??? ? ? ?Diabetes Maintenance-Serum Creatinine on??12/02/18.??Satisfied by Germaine Hogan.  ??? ? ? ?Diabetes Maintenance-Urine Dipstick on??12/02/18.??Satisfied by Laura Colon  ??? ? ? ?Diabetes Screening on??12/02/18.??Satisfied by Germaine Hogan.  ??? ? ? ?Hypertension Management-Blood Pressure on??04/04/19.??Satisfied by Cristal LIMON, Mary Beth L.  ??? ? ? ?Hypertension Management-BMP on??12/02/18.??Satisfied by Germaine Hogan.  ??? ? ? ?Influenza Vaccine on??12/03/18.??Satisfied by Cristal LIMON, Mary Beth L.  ??? ? ? ?Lipid Screening on??08/28/18.??Satisfied by Simón Madrid  ??? ? ? ?Obesity Screening on??04/04/19.??Satisfied by Cristal LIMON, Mary Beth L.  ??? ??Canceled?  ??? ? ? ?Alcohol Misuse Screening on??04/04/19.?Recorded by Fred Pandey MD?Reason: Expectation Not Necessary  ??? ? ? ?Smoking Cessation on??04/03/19.?Recorded by Fred Pandey MD?Reason: Expectation Not Necessary  ?  ?

## 2022-06-05 PROBLEM — E03.9 HYPOTHYROIDISM: Status: ACTIVE | Noted: 2022-06-05

## 2022-06-05 PROBLEM — E11.9 DIABETES MELLITUS: Status: ACTIVE | Noted: 2022-06-05

## 2022-06-05 PROBLEM — M50.90 DISORDER OF INTERVERTEBRAL DISC OF CERVICAL SPINE: Status: ACTIVE | Noted: 2022-06-05

## 2022-06-05 PROBLEM — I10 HYPERTENSION: Status: ACTIVE | Noted: 2022-06-05

## 2022-06-05 PROBLEM — E78.00 HYPERCHOLESTEROLEMIA: Status: ACTIVE | Noted: 2022-06-05

## 2022-06-05 PROBLEM — J30.9 ALLERGIC RHINITIS: Status: ACTIVE | Noted: 2022-06-05

## 2022-06-05 PROBLEM — G47.33 OBSTRUCTIVE SLEEP APNEA SYNDROME IN ADULT: Status: ACTIVE | Noted: 2022-06-05

## 2022-12-04 ENCOUNTER — HOSPITAL ENCOUNTER (EMERGENCY)
Facility: HOSPITAL | Age: 47
Discharge: HOME | End: 2022-12-04
Attending: FAMILY MEDICINE
Payer: COMMERCIAL

## 2022-12-04 VITALS
OXYGEN SATURATION: 96 % | DIASTOLIC BLOOD PRESSURE: 68 MMHG | HEART RATE: 81 BPM | SYSTOLIC BLOOD PRESSURE: 108 MMHG | HEIGHT: 76 IN | TEMPERATURE: 99 F | BODY MASS INDEX: 34.34 KG/M2 | RESPIRATION RATE: 24 BRPM | WEIGHT: 282 LBS

## 2022-12-04 DIAGNOSIS — L03.90 CELLULITIS, UNSPECIFIED CELLULITIS SITE: Primary | ICD-10-CM

## 2022-12-04 PROCEDURE — 25000003 PHARM REV CODE 250

## 2022-12-04 PROCEDURE — 99284 EMERGENCY DEPT VISIT MOD MDM: CPT

## 2022-12-04 PROCEDURE — 96372 THER/PROPH/DIAG INJ SC/IM: CPT | Performed by: FAMILY MEDICINE

## 2022-12-04 PROCEDURE — 63600175 PHARM REV CODE 636 W HCPCS: Performed by: FAMILY MEDICINE

## 2022-12-04 RX ORDER — CEFTRIAXONE 1 G/1
1 INJECTION, POWDER, FOR SOLUTION INTRAMUSCULAR; INTRAVENOUS
Status: COMPLETED | OUTPATIENT
Start: 2022-12-04 | End: 2022-12-04

## 2022-12-04 RX ORDER — LIDOCAINE HYDROCHLORIDE 10 MG/ML
INJECTION INFILTRATION; PERINEURAL
Status: COMPLETED
Start: 2022-12-04 | End: 2022-12-04

## 2022-12-04 RX ORDER — KETOROLAC TROMETHAMINE 10 MG/1
10 TABLET, FILM COATED ORAL EVERY 6 HOURS
Qty: 12 TABLET | Refills: 0 | Status: SHIPPED | OUTPATIENT
Start: 2022-12-04 | End: 2022-12-07

## 2022-12-04 RX ORDER — SULFAMETHOXAZOLE AND TRIMETHOPRIM 800; 160 MG/1; MG/1
1 TABLET ORAL 2 TIMES DAILY
Qty: 20 TABLET | Refills: 0 | Status: SHIPPED | OUTPATIENT
Start: 2022-12-04 | End: 2022-12-14

## 2022-12-04 RX ADMIN — LIDOCAINE HYDROCHLORIDE 100 MG: 10 INJECTION, SOLUTION INFILTRATION; PERINEURAL at 10:12

## 2022-12-04 RX ADMIN — CEFTRIAXONE SODIUM 1 G: 1 INJECTION, POWDER, FOR SOLUTION INTRAMUSCULAR; INTRAVENOUS at 10:12

## 2022-12-04 NOTE — Clinical Note
"Cliff Ragsdale (WILLIE)joseph III was seen and treated in our emergency department on 12/4/2022.  He may return to work on 12/06/2022.       If you have any questions or concerns, please don't hesitate to call.       RN    "

## 2022-12-05 NOTE — ED PROVIDER NOTES
Encounter Date: 12/4/2022       History     Chief Complaint   Patient presents with    rt leg skin problem     Rt leg with a sore then redness to rt leg up to knee/ has an ongoing staph infect for years that he takes bactrim for/ fever of 101.8 at 8pm and was given tylenol 1000mg     46-year-old male patient comes in with reoccurring staph infection to the right lower leg in the form of cellulitis otherwise patient has good bilateral lower extremity blood flow no signs of DVT with 1 swelling more on the other side no signs of difficulties with arterial flow.  Did have a low-grade fever no chills or night sweats      Review of patient's allergies indicates:   Allergen Reactions    Liraglutide      pancreatitis  Other reaction(s): Pancreatitis     Past Medical History:   Diagnosis Date    Acute pancreatitis     Cellulitis and abscess of leg     Cervical disc disease     DM (diabetes mellitus)     HTN (hypertension)     Hypothyroidism, unspecified     WAQAS (obstructive sleep apnea)      Past Surgical History:   Procedure Laterality Date    BACK SURGERY  06/2018    microdisectomy on I-3 and I-4    CERVICAL DISC SURGERY  06/2015    COLONOSCOPY  03/2016    dentures       Family History   Problem Relation Age of Onset    Bladder Cancer Mother 69    Diabetes Mother     Hypertension Mother     No Known Problems Sister     Chronic back pain Brother         lower back injury    Diabetes Brother     Neuropathy Brother      Social History     Tobacco Use    Smoking status: Former    Smokeless tobacco: Never   Substance Use Topics    Alcohol use: Not Currently    Drug use: Not Currently     Review of Systems   Constitutional:  Positive for fever.   HENT:  Negative for sore throat.    Respiratory:  Negative for shortness of breath.    Cardiovascular:  Negative for chest pain.   Gastrointestinal:  Negative for nausea.   Genitourinary:  Negative for dysuria.   Musculoskeletal:  Negative for back pain.   Skin:  Positive for rash.    Neurological:  Negative for weakness.   Hematological:  Does not bruise/bleed easily.     Physical Exam     Initial Vitals [12/04/22 2139]   BP Pulse Resp Temp SpO2   108/68 81 (!) 24 98.5 °F (36.9 °C) 96 %      MAP       --         Physical Exam    Nursing note and vitals reviewed.  Constitutional: He appears well-developed and well-nourished. He is not diaphoretic. No distress.   HENT:   Head: Normocephalic and atraumatic.   Right Ear: External ear normal.   Left Ear: External ear normal.   Nose: Nose normal.   Mouth/Throat: Oropharynx is clear and moist. No oropharyngeal exudate.   Eyes: Conjunctivae and EOM are normal. Pupils are equal, round, and reactive to light. Right eye exhibits no discharge. Left eye exhibits no discharge.   Neck: Neck supple. No thyromegaly present. No tracheal deviation present. No JVD present.   Normal range of motion.  Cardiovascular:  Normal rate, regular rhythm, normal heart sounds and intact distal pulses.     Exam reveals no gallop and no friction rub.       No murmur heard.  Pulmonary/Chest: Breath sounds normal. No stridor. No respiratory distress. He has no wheezes. He has no rhonchi. He has no rales. He exhibits no tenderness.   Abdominal: Abdomen is soft. Bowel sounds are normal. He exhibits no distension. There is no abdominal tenderness. There is no rebound and no guarding.   Musculoskeletal:         General: No tenderness or edema. Normal range of motion.      Cervical back: Normal range of motion and neck supple.     Lymphadenopathy:     He has no cervical adenopathy.   Neurological: He is alert and oriented to person, place, and time. He has normal strength and normal reflexes. No cranial nerve deficit or sensory deficit. GCS score is 15. GCS eye subscore is 4. GCS verbal subscore is 5. GCS motor subscore is 6.   Skin: Skin is warm and dry. No rash and no abscess noted. No erythema.   Rash   Psychiatric: He has a normal mood and affect. His behavior is normal. Judgment  and thought content normal.       ED Course   Procedures  Labs Reviewed - No data to display       Imaging Results    None          Medications   cefTRIAXone injection 1 g (has no administration in time range)                              Clinical Impression:   Final diagnoses:  [L03.90] Cellulitis, unspecified cellulitis site (Primary)      ED Disposition Condition    Discharge Stable          ED Prescriptions       Medication Sig Dispense Start Date End Date Auth. Provider    sulfamethoxazole-trimethoprim 800-160mg (BACTRIM DS) 800-160 mg Tab Take 1 tablet by mouth 2 (two) times daily. for 10 days 20 tablet 12/4/2022 12/14/2022 Alejo Bell MD    ketorolac (TORADOL) 10 mg tablet Take 1 tablet (10 mg total) by mouth every 6 (six) hours. for 3 days 12 tablet 12/4/2022 12/7/2022 Alejo Bell MD          Follow-up Information    None          Alejo Bell MD  12/04/22 [id number]

## 2023-01-23 PROBLEM — Z00.00 ENCOUNTER FOR WELLNESS EXAMINATION IN ADULT: Status: ACTIVE | Noted: 2023-01-23

## 2023-01-23 PROBLEM — Z12.5 PROSTATE CANCER SCREENING: Status: ACTIVE | Noted: 2023-01-23

## 2023-01-23 PROBLEM — Z23 IMMUNIZATION DUE: Status: ACTIVE | Noted: 2023-01-23

## 2023-05-01 PROBLEM — Z00.00 ENCOUNTER FOR WELLNESS EXAMINATION IN ADULT: Status: RESOLVED | Noted: 2023-01-23 | Resolved: 2023-05-01

## 2023-06-13 PROBLEM — S39.012A STRAIN OF LUMBAR REGION: Status: ACTIVE | Noted: 2023-06-13

## 2023-09-11 ENCOUNTER — HOSPITAL ENCOUNTER (EMERGENCY)
Facility: HOSPITAL | Age: 48
Discharge: HOME | End: 2023-09-12
Attending: SPECIALIST
Payer: COMMERCIAL

## 2023-09-11 VITALS
SYSTOLIC BLOOD PRESSURE: 145 MMHG | WEIGHT: 285 LBS | OXYGEN SATURATION: 98 % | TEMPERATURE: 99 F | RESPIRATION RATE: 18 BRPM | HEIGHT: 76 IN | BODY MASS INDEX: 34.7 KG/M2 | HEART RATE: 90 BPM | DIASTOLIC BLOOD PRESSURE: 73 MMHG

## 2023-09-11 DIAGNOSIS — L03.115 CELLULITIS OF RIGHT LOWER EXTREMITY: Primary | ICD-10-CM

## 2023-09-11 PROCEDURE — 99284 EMERGENCY DEPT VISIT MOD MDM: CPT

## 2023-09-11 PROCEDURE — 25000003 PHARM REV CODE 250: Performed by: SPECIALIST

## 2023-09-11 PROCEDURE — 96372 THER/PROPH/DIAG INJ SC/IM: CPT | Performed by: SPECIALIST

## 2023-09-11 PROCEDURE — 63600175 PHARM REV CODE 636 W HCPCS: Performed by: SPECIALIST

## 2023-09-11 RX ORDER — SULFAMETHOXAZOLE AND TRIMETHOPRIM 800; 160 MG/1; MG/1
1 TABLET ORAL 2 TIMES DAILY
Qty: 20 TABLET | Refills: 0 | Status: SHIPPED | OUTPATIENT
Start: 2023-09-11 | End: 2023-09-21

## 2023-09-11 RX ORDER — KETOROLAC TROMETHAMINE 10 MG/1
10 TABLET, FILM COATED ORAL
Status: COMPLETED | OUTPATIENT
Start: 2023-09-11 | End: 2023-09-11

## 2023-09-11 RX ORDER — CEFAZOLIN SODIUM 1 G/3ML
1 INJECTION, POWDER, FOR SOLUTION INTRAMUSCULAR; INTRAVENOUS
Status: COMPLETED | OUTPATIENT
Start: 2023-09-11 | End: 2023-09-11

## 2023-09-11 RX ORDER — DICLOFENAC SODIUM 50 MG/1
50 TABLET, DELAYED RELEASE ORAL 3 TIMES DAILY PRN
Qty: 20 TABLET | Refills: 0 | Status: SHIPPED | OUTPATIENT
Start: 2023-09-11 | End: 2023-11-13

## 2023-09-11 RX ADMIN — CEFAZOLIN 1 G: 330 INJECTION, POWDER, FOR SOLUTION INTRAMUSCULAR; INTRAVENOUS at 11:09

## 2023-09-11 RX ADMIN — KETOROLAC TROMETHAMINE 10 MG: 10 TABLET, FILM COATED ORAL at 11:09

## 2023-09-11 NOTE — Clinical Note
"Tae"TAE" Leia FLOWER was seen and treated in our emergency department on 9/11/2023.  He may return to work on 09/14/2023.       If you have any questions or concerns, please don't hesitate to call.      Ced Diaz MD"

## 2023-09-12 NOTE — ED PROVIDER NOTES
Encounter Date: 2023       History     Chief Complaint   Patient presents with    Leg Swelling     Pt into ED with complaints of right leg pain and swelling that started about 1pm today. Pt believes he was bitten by some type of insect     Patient reports right lower extremity redness and swelling that began earlier today; he believes he might have been bitten by an insect; he has a history of leg swelling over many years with flare-ups requiring antibiotics; no fever or chills    The history is provided by the patient and the spouse.     Review of patient's allergies indicates:   Allergen Reactions    Liraglutide      pancreatitis  Other reaction(s): Pancreatitis     Past Medical History:   Diagnosis Date    Acute pancreatitis     Cellulitis and abscess of leg     Cervical disc disease     DM (diabetes mellitus)     HTN (hypertension)     Hypothyroidism, unspecified     WAQAS (obstructive sleep apnea)      Past Surgical History:   Procedure Laterality Date    BACK SURGERY  2018    microdisectomy on I-3 and I-4    CERVICAL DISC SURGERY  2015    COLONOSCOPY W/ POLYPECTOMY  2023    w/ egd. 5 year/Dr. Whittaker    dentures      ESOPHAGOGASTRODUODENOSCOPY  2016    Dr Whittaker     Family History   Problem Relation Age of Onset    Bladder Cancer Mother 69    Diabetes Mother     Hypertension Mother     No Known Problems Sister     Chronic back pain Brother         lower back injury    Diabetes Brother     Neuropathy Brother      Social History     Tobacco Use    Smoking status: Former     Current packs/day: 0.00     Average packs/day: 1 pack/day for 8.0 years (8.0 ttl pk-yrs)     Types: Cigarettes     Start date:      Quit date:      Years since quittin.7    Smokeless tobacco: Never   Substance Use Topics    Alcohol use: Not Currently    Drug use: Not Currently     Review of Systems   Constitutional: Negative.    HENT: Negative.     Respiratory: Negative.     Cardiovascular: Negative.     Gastrointestinal: Negative.    Genitourinary: Negative.    Musculoskeletal:  Positive for arthralgias, back pain and neck pain.   Neurological: Negative.        Physical Exam     Initial Vitals [09/11/23 2233]   BP Pulse Resp Temp SpO2   (!) 145/73 90 18 99.1 °F (37.3 °C) 98 %      MAP       --         Physical Exam    Nursing note and vitals reviewed.  Constitutional: He appears well-developed and well-nourished.   HENT:   Head: Normocephalic and atraumatic.   Eyes: EOM are normal. Pupils are equal, round, and reactive to light.   Neck: Neck supple.   Normal range of motion.  Cardiovascular:  Normal rate, regular rhythm and normal heart sounds.           Pulmonary/Chest: Breath sounds normal.   Abdominal: Abdomen is soft.   Musculoskeletal:         General: Normal range of motion.      Cervical back: Normal range of motion and neck supple.     Neurological: He is alert and oriented to person, place, and time.   Skin: Skin is warm and dry.   Right lower extremity with edema 1 to 2+ pitting, erythema and tenderness, warmth; no red streaking         ED Course   Procedures  Labs Reviewed - No data to display       Imaging Results    None          Medications   ceFAZolin injection 1 g (1 g Intramuscular Given 9/11/23 2348)   ketorolac tablet 10 mg (10 mg Oral Given 9/11/23 2351)     Medical Decision Making  Patient reports right lower extremity redness and swelling that began earlier today; he believes he might have been bitten by an insect; he has a history of leg swelling over many years with flare-ups requiring antibiotics; no fever or chills; past medical history HTN, WAQAS, pancreatitis, T2 DM, hypothyroidism    DIFFERENTIAL DIAGNOSIS- right lower extremity cellulitis, stasis dermatitis, vascular insufficiency    Risk  Prescription drug management.  Risk Details: Patient has had this condition before and has been treated with antibiotics in the past; no evidence of a bite to the leg; this does appear to be stasis  "dermatitis with lower extremity cellulitis; he was given Ancef 1 g IM and Toradol 10 mg p.o.; prescription sent to his pharmacy for Bactrim DS twice a day for 10 days and diclofenac as needed for pain and discomfort; recommend elevation of the leg 2 to 3 times a day       Patient Vitals for the past 24 hrs:   BP Temp Temp src Pulse Resp SpO2 Height Weight   09/11/23 2233 (!) 145/73 99.1 °F (37.3 °C) Oral 90 18 98 % 6' 4" (1.93 m) 129.3 kg (285 lb)                   The patient is resting comfortably and in no acute distress.   He states that his symptoms have improved after treatment in Emergency Department. I personally discussed his test results and treatment plan.  Gave strict ED precautions.  Specific conditions for return to the emergency department and importance of follow up with his primary care provided or the physician listed on the discharge instructions.  Patient voices understanding and agrees to the plan discussed. All patients' questions have been answered at this time.   He has remained hemodynamically stable throughout entire stay in ED and is stable for discharge home.           Clinical Impression:   Final diagnoses:  [L03.115] Cellulitis of right lower extremity (Primary)        ED Disposition Condition    Discharge Stable          ED Prescriptions       Medication Sig Dispense Start Date End Date Auth. Provider    sulfamethoxazole-trimethoprim 800-160mg (BACTRIM DS) 800-160 mg Tab Take 1 tablet by mouth 2 (two) times daily. for 10 days 20 tablet 9/11/2023 9/21/2023 Ced Diaz MD    diclofenac (VOLTAREN) 50 MG EC tablet Take 1 tablet (50 mg total) by mouth 3 (three) times daily as needed (Pain). 20 tablet 9/11/2023 -- Ced Diaz MD          Follow-up Information       Follow up With Specialties Details Why Contact Info    Jesse Pandey MD Family Medicine In 3 days As needed [address] [place] [id number]  [phone]               Ced Diaz MD  09/12/23 0011    "

## 2023-10-01 PROBLEM — L03.115 CELLULITIS OF RIGHT LEG: Status: ACTIVE | Noted: 2023-10-01

## 2023-10-02 PROBLEM — R49.0 CHRONIC HOARSENESS: Status: ACTIVE | Noted: 2023-10-02

## 2023-10-02 PROCEDURE — 86803 HEPATITIS C AB TEST: CPT | Performed by: FAMILY MEDICINE

## 2023-10-02 PROCEDURE — 87389 HIV-1 AG W/HIV-1&-2 AB AG IA: CPT | Performed by: FAMILY MEDICINE

## 2023-12-17 NOTE — HISTORICAL [facility] CERNER
This is a historical note converted from Fredo. Formatting and pictures may have been removed.  Please reference Fredo for original formatting and attached multimedia. Chief Complaint  CPX/FASTING  History of Present Illness  Patient is here today for wellness CPX.? He had lower back surgery?several months ago and is doing well.? He continues to follow his diet?however has not been able to exercise as much?lately as result of?recovery from surgery.? He is tolerating all medications reports his blood sugar has been well controlled.  Review of Systems  GENERAL:?no? unexplained wt ?loss or gain, fever, fatigue, chills, night sweats or ?weakness  HEENT: no?? sore throat, ?ear pain, ?sinus pressure, ?nasal congestion, or ?rhinorrhea  VISION:?no ?vision changes, ?glaucoma, cataracts, +contacts? + yearly eye exam end of 2017  CARDIAC: no? chest pain,??palpitations,?Dyspnea on exertion, ?orthopnea  RESPIRATORY:?no??cough,?wheezing, sputum production or?SOB  GI: no???abdominal pain, n&v,?constipation, diarrhea,??blood in stool or_?no? family history of colon cancer?_  :?no? dysuria, ?hematuria, ?frequency, urgency, ?incontinence,? testicular pain/swelling,?_? no? ?family history of prostate cancer_  [facility]:? no? myalgia, ?weakness, edema,? arthralgia, or ?joint effusion  SKIN:? No?rash, hives,?itching or?sores  NEURO:? No?headaches, numbness, tingling,? weakness, or ?dizziness  PSYCH:? No anxiety, depression, ?irritability, ?suicidal ideation or hallucinations  ENDO:? No ?polyuria, ?polydipsia, ?polyphagia  HEME:? No Bruising, lymphadenopathy, bleeding disorders ?or?signs of anemia  Physical Exam  Vitals & Measurements  HR:?52(Peripheral)? BP:?128/70?  HT:?193?cm? HT:?193?cm? WT:?154.2?kg? WT:?154.2?kg? BMI:?41.4?  GENERAL: NAD, alert and oriented x 3  SKIN:? no rash or abnormal appearing skin lesions  HEENT:? PERRLA, EOMI, mouth wnl, throat wnl, EAC and TM wnl bilaterally  NECK:? FROM, no lymphadenopathy, no thyroid  abnormalities palpable  CHEST:? CTA bilaterally no wheezes, crackles or rubs  CARDIAC:? RRR, no murmurs audible  ABDOMEN:? Soft, nontender, nondistended, NBSx4,?no rebound or guarding, no HSM  EXTREMITIES:? no clubbing or?cyanosis, 0-1 bilateral edema.? joints wnl. +2 DP/PT pulse bilaterally  NEURO:? no sensory or motor deficits noted. CN II-XII intact. Gait wnl.?  GENITAL: normal testes, no hernia  RECTAL: no hemorrhoids or fissures, prostate WNL  Assessment/Plan  1.?Wellness examination  Pneumovax today, CBC, CMP, FLP, U/A, PSA, TSH, A1c, Microalb, Weight loss,?encourage patient to increase cardiovascular exercise and attempt to get at least 150 minutes of moderate aerobic exercise weekly.  Ordered:  pneumococcal 23-polyvalent vaccine, 0.5 mL, IM, Once-Unscheduled, first dose 08/28/18 7:49:00 CDT  CBC w/ Auto Diff, Routine collect, 08/28/18 7:49:00 CDT, Blood, Order for future visit, Stop date 08/28/18 7:49:00 CDT, Lab Collect, Wellness examination, 08/28/18 7:49:00 CDT  Comprehensive Metabolic Panel, Routine collect, 08/28/18 7:49:00 CDT, Blood, Order for future visit, Stop date 08/28/18 7:49:00 CDT, Lab Collect, Wellness examination, 08/28/18 7:49:00 CDT  Hemoglobin A1c, Routine collect, 08/28/18 7:49:00 CDT, Blood, Order for future visit, Stop date 08/28/18 7:49:00 CDT, Lab Collect, Wellness examination  Diabetes mellitus, 08/28/18 7:49:00 CDT  Lab Collection Request, 08/28/18 7:49:00 CDT, [facility] - AFP, 08/28/18 7:49:00 CDT  Lipid Panel, Routine collect, 08/28/18 7:49:00 CDT, Blood, Order for future visit, Stop date 08/28/18 7:49:00 CDT, Lab Collect, Wellness examination, 08/28/18 7:49:00 CDT  Microalbumin Urine, Routine collect, Urine, Order for future visit, 08/28/18 7:49:00 CDT, Stop date 08/28/18 7:49:00 CDT, Nurse collect, Wellness examination  Diabetes mellitus  [facility] Health Care Est 40-64 years [id number] PC, Wellness examination  Diabetes mellitus  Hypercholesteremia  HTN - Hypertension   Hypothyroid  Obstructive sleep apnea of adult, [facility] - AFP, 08/28/18 7:49:00 CDT  Prostate Specific Antigen, Routine collect, 08/28/18 7:49:00 CDT, Blood, Order for future visit, Stop date 08/28/18 7:49:00 CDT, Lab Collect, Wellness examination, 08/28/18 7:49:00 CDT  Thyroid Stimulating Hormone, Routine collect, 08/28/18 7:49:00 CDT, Blood, Order for future visit, Stop date 08/28/18 7:49:00 CDT, Lab Collect, Wellness examination  Hypothyroid, 08/28/18 7:49:00 CDT  Urinalysis Complete no reflex, Routine collect, Urine, Order for future visit, 08/28/18 7:49:00 CDT, Stop date 08/28/18 7:49:00 CDT, Nurse collect, Wellness examination  ?  2.?Diabetes mellitus  ?A1c, microalb, Continue Glyburide/metformin 5/500 2 bid  Ordered:  pneumococcal 23-polyvalent vaccine, 0.5 mL, IM, Once-Unscheduled, first dose 08/28/18 7:49:00 CDT  Clinic Follow up, *Est. 02/28/19 3:00:00 CST, Order for future visit, Diabetes mellitus  Hypercholesteremia  HTN - Hypertension  Hypothyroid, [facility]  Hemoglobin A1c, Routine collect, 08/28/18 7:49:00 CDT, Blood, Order for future visit, Stop date 08/28/18 7:49:00 CDT, Lab Collect, Wellness examination  Diabetes mellitus, 08/28/18 7:49:00 CDT  Microalbumin Urine, Routine collect, Urine, Order for future visit, 08/28/18 7:49:00 CDT, Stop date 08/28/18 7:49:00 CDT, Nurse collect, Wellness examination  Diabetes mellitus  Preventative Health Care Est 40-64 years [id number] PC, Wellness examination  Diabetes mellitus  Hypercholesteremia  HTN - Hypertension  Hypothyroid  Obstructive sleep apnea of adult, [facility] - AFP, 08/28/18 7:49:00 CDT  ?  3.?Hypercholesteremia  ?Continue simvastatin 10 mg  Ordered:  Clinic Follow up, *Est. 02/28/19 3:00:00 CST, Order for future visit, Diabetes mellitus  Hypercholesteremia  HTN - Hypertension  Hypothyroid, [facility] AFP  Preventative Health Care Est 40-64 years [id number] PC, Wellness examination  Diabetes mellitus  Hypercholesteremia  HTN - Hypertension   Hypothyroid  Obstructive sleep apnea of adult, [facility], 08/28/18 7:49:00 CDT  ?  4.?HTN - Hypertension  Continue Lisinopril 40.  Ordered:  Clinic Follow up, *Est. 02/28/19 3:00:00 CST, Order for future visit, Diabetes mellitus  Hypercholesteremia  HTN - Hypertension  Hypothyroid, [facility]  Preventative Health Care Est 40-64 years [id number] PC, Wellness examination  Diabetes mellitus  Hypercholesteremia  HTN - Hypertension  Hypothyroid  Obstructive sleep apnea of adult, [facility], 08/28/18 7:49:00 CDT  ?  5.?Hypothyroid  ?check TSH, Continue Levothyroxine 150.  Ordered:  Clinic Follow up, *Est. 02/28/19 3:00:00 CST, Order for future visit, Diabetes mellitus  Hypercholesteremia  HTN - Hypertension  Hypothyroid, [facility] Care Est 40-64 years [id number] PC, Wellness examination  Diabetes mellitus  Hypercholesteremia  HTN - Hypertension  Hypothyroid  Obstructive sleep apnea of adult, [facility], 08/28/18 7:49:00 CDT  Thyroid Stimulating Hormone, Routine collect, 08/28/18 7:49:00 CDT, Blood, Order for future visit, Stop date 08/28/18 7:49:00 CDT, Lab Collect, Wellness examination  Hypothyroid, 08/28/18 7:49:00 CDT  ?  6.?Obstructive sleep apnea of adult  Not using?CPAP  Ordered:  Preventative Health Care Est 40-64 years [id number] PC, Wellness examination  Diabetes mellitus  Hypercholesteremia  HTN - Hypertension  Hypothyroid  Obstructive sleep apnea of adult, [facility], 08/28/18 7:49:00 CDT  ?   Problem List/Past Medical History  Ongoing  Cervical disc disease  Diabetes mellitus  HTN - Hypertension  Hypercholesteremia  Hypothyroid  Obstructive sleep apnea of adult  Preop examination  Historical  Acute pancreatitis  right Cellulitis of lower leg  Procedure/Surgical History  Back surgery-microdiscectomy on l-3 and l-4 (06/01/2018)  Cervical disc surgery (06/2015)  Dentures   Medications  glyburide-metformin 5 mg-500 mg oral tablet, 2 tab(s), Oral, BID, 5  refills  levothyroxine 150 mcg (0.15 mg) oral tablet, See Instructions, 5 refills  lisinopril 40 mg oral tablet, 40 mg= 1 tab(s), Oral, Daily  OMEPRAZOLE 20 MG CPDR, 20 mg= 1 cap(s), Oral, Daily  Pneumovax 23, 0.5 mL, IM, Once-Unscheduled  simvastatin 10 mg oral tablet, See Instructions, 1 refills  Allergies  No Known Medication Allergies  Social History  Alcohol  Past, 01/28/2018  Employment/School  Employed, Work/School description: ., 01/28/2018  Home/Environment  Lives with Spouse., 03/12/2018  Tobacco  Former smoker, Cigarettes, 01/28/2018  Family History  Diabetes mellitus: Mother and Brother.  Hypertension.: Mother.  Lower back injury: Brother.Negative: Sister.  Neuropathy: Brother.  Immunizations  Vaccine Date Status Comments   diphtheria/pertussis, acellular/tetanus 2014 Recorded hospital   Health Maintenance  Health Maintenance  ???Pending?(in the next year)  ??? ??OverDue  ??? ? ? ?Smoking Cessation due??07/22/17??and every 1??year(s)  ??? ??Due?  ??? ? ? ?Alcohol Misuse Screening due??08/28/18??and every 1??year(s)  ??? ? ? ?Depression Screening due??08/28/18??and every?  ??? ? ? ?Diabetes Maintenance-Eye Exam due??08/28/18??and every?  ??? ? ? ?Hypertension Management-Education due??08/28/18??and every 1??year(s)  ??? ? ? ?Influenza Vaccine due??08/28/18??and every?  ??? ? ? ?Tetanus Vaccine due??08/28/18??and every 10??year(s)  ??? ??Due In Future?  ??? ? ? ?Diabetes Maintenance-Fasting Lipid Profile not due until??03/12/19??and every 1??year(s)  ??? ? ? ?Diabetes Maintenance-Foot Exam not due until??03/12/19??and every 1??year(s)  ??? ? ? ?Diabetes Maintenance-HgbA1c not due until??03/12/19??and every 1??year(s)  ??? ? ? ?Hypertension Management-BMP not due until??03/12/19??and every 1??year(s)  ??? ? ? ?Diabetes Maintenance-Microalbumin not due until??03/12/19??and every 1??year(s)  ??? ? ? ?Diabetes Maintenance-Serum Creatinine not due until??03/12/19??and every 1??year(s)  ???  ? ? ?Diabetes Maintenance-Urine Dipstick not due until??04/01/19??and every 1??year(s)  ???Satisfied?(in the past 1 year)  ??? ??Satisfied?  ??? ? ? ?Blood Pressure Screening on??08/28/18.??Satisfied by Mary Beth Bee  ??? ? ? ?Body Mass Index Check on??08/28/18.??Satisfied by Mary Beth Bee  ??? ? ? ?Diabetes Maintenance-Fasting Lipid Profile on??03/12/18.??Satisfied by Rhea Lowry  ??? ? ? ?Diabetes Maintenance-Foot Exam on??03/12/18.??Satisfied by Fred Pandey MD  ??? ? ? ?Diabetes Maintenance-HgbA1c on??08/28/18.??Satisfied by Fred Pandey MD  ??? ? ? ?Diabetes Maintenance-Microalbumin on??03/12/18.??Satisfied by Simón Madrid  ??? ? ? ?Diabetes Maintenance-Serum Creatinine on??03/12/18.??Satisfied by Rhea Lowry  ??? ? ? ?Diabetes Maintenance-Urine Dipstick on??04/01/18.??Satisfied by Basilio Tipton  ??? ? ? ?Diabetes Screening on??08/28/18.??Satisfied by Fred Pandey MD  ??? ? ? ?Hypertension Management-Blood Pressure on??08/28/18.??Satisfied by Mary Beth Bee  ??? ? ? ?Hypertension Management-BMP on??03/12/18.??Satisfied by Rhea Lowry  ??? ? ? ?Lipid Screening on??08/28/18.??Satisfied by Fred Pandey MD  ??? ? ? ?Obesity Screening on??08/28/18.??Satisfied by Mary Beth Bee  ?  ?      denies

## 2024-07-29 ENCOUNTER — HOSPITAL ENCOUNTER (EMERGENCY)
Facility: HOSPITAL | Age: 49
Discharge: HOME | End: 2024-07-29
Attending: EMERGENCY MEDICINE
Payer: COMMERCIAL

## 2024-07-29 VITALS
SYSTOLIC BLOOD PRESSURE: 136 MMHG | BODY MASS INDEX: 37.3 KG/M2 | HEIGHT: 75 IN | RESPIRATION RATE: 17 BRPM | TEMPERATURE: 98 F | OXYGEN SATURATION: 99 % | HEART RATE: 60 BPM | WEIGHT: 300 LBS | DIASTOLIC BLOOD PRESSURE: 67 MMHG

## 2024-07-29 DIAGNOSIS — M54.50 ACUTE MIDLINE LOW BACK PAIN WITHOUT SCIATICA: Primary | ICD-10-CM

## 2024-07-29 PROCEDURE — 99284 EMERGENCY DEPT VISIT MOD MDM: CPT | Mod: 25

## 2024-07-29 PROCEDURE — 63600175 PHARM REV CODE 636 W HCPCS: Performed by: EMERGENCY MEDICINE

## 2024-07-29 PROCEDURE — 96372 THER/PROPH/DIAG INJ SC/IM: CPT | Performed by: EMERGENCY MEDICINE

## 2024-07-29 RX ORDER — KETOROLAC TROMETHAMINE 30 MG/ML
30 INJECTION, SOLUTION INTRAMUSCULAR; INTRAVENOUS
Status: COMPLETED | OUTPATIENT
Start: 2024-07-29 | End: 2024-07-29

## 2024-07-29 RX ORDER — IBUPROFEN 800 MG/1
800 TABLET ORAL EVERY 8 HOURS PRN
Qty: 60 TABLET | Refills: 0 | Status: SHIPPED | OUTPATIENT
Start: 2024-07-29

## 2024-07-29 RX ORDER — DEXAMETHASONE SODIUM PHOSPHATE 4 MG/ML
8 INJECTION, SOLUTION INTRA-ARTICULAR; INTRALESIONAL; INTRAMUSCULAR; INTRAVENOUS; SOFT TISSUE
Status: COMPLETED | OUTPATIENT
Start: 2024-07-29 | End: 2024-07-29

## 2024-07-29 RX ORDER — LIDOCAINE 50 MG/G
1 PATCH TOPICAL DAILY PRN
Qty: 30 PATCH | Refills: 0 | Status: SHIPPED | OUTPATIENT
Start: 2024-07-29

## 2024-07-29 RX ORDER — METHOCARBAMOL 750 MG/1
1500 TABLET, FILM COATED ORAL EVERY 8 HOURS PRN
Qty: 30 TABLET | Refills: 0 | Status: SHIPPED | OUTPATIENT
Start: 2024-07-29 | End: 2024-08-03

## 2024-07-29 RX ADMIN — DEXAMETHASONE SODIUM PHOSPHATE 8 MG: 4 INJECTION, SOLUTION INTRA-ARTICULAR; INTRALESIONAL; INTRAMUSCULAR; INTRAVENOUS; SOFT TISSUE at 10:07

## 2024-07-29 RX ADMIN — KETOROLAC TROMETHAMINE 30 MG: 30 INJECTION, SOLUTION INTRAMUSCULAR at 10:07

## 2024-07-29 NOTE — ED PROVIDER NOTES
NAME:  Cliff Dupree III  CSN:     [id number]  MRN:    [id number]  ADMIT DATE: 7/29/2024        eMERGENCY dEPARTMENT eNCOUnter    CHIEF COMPLAINT    Chief Complaint   Patient presents with    Back Pain     Pt c/o lower back pain since Thursday; states he has a hx of back problems - denies new injury. Pt states his last dose of pain meds was 3 motrin around 0600 today        HPI      Cliff Dupree III is a 48 y.o. male who presents to the ED for lower back pain that started approximately 3 days ago.  Patient reports he has a history of lower back issues in his required what he thinks is a diskectomy in the past.  He denies any pain radiating down his legs.  He has been taking ibuprofen and took 1 dose of Flexeril at home with no relief.  Patient does heavy lifting for work          ALLERGIES    Review of patient's allergies indicates:   Allergen Reactions    Liraglutide      pancreatitis  Other reaction(s): Pancreatitis       PAST MEDICAL HISTORY  Past Medical History:   Diagnosis Date    Acute pancreatitis     Allergic rhinitis due to pollen     Cellulitis and abscess of leg     Cervical disc disease     Chronic hoarseness     Disorder of intervertebral disc of cervical spine     DM (diabetes mellitus)     HTN (hypertension)     Hypercholesterolemia     Hypothyroidism, unspecified     Morbid obesity     WAQAS (obstructive sleep apnea)     Strain of lumbar region        SURGICAL HISTORY    Past Surgical History:   Procedure Laterality Date    BACK SURGERY  06/2018    microdisectomy on I-3 and I-4    CERVICAL DISC SURGERY  06/2015    COLONOSCOPY W/ POLYPECTOMY  05/08/2023    w/ egd. 5 year/Dr. Whittaker    dentures      ESOPHAGOGASTRODUODENOSCOPY  03/2016    Dr Whittaker    REMOVAL, MASS OR POLYP, NOSE, ENDOSCOPIC, WITH REMOVAL OR INSERTION OF PACKING OR STENT  12/2023    vocal cord       SOCIAL HISTORY    Social History     Socioeconomic History    Marital status:      Spouse name: Rhea    Number of children: 3  "  Occupational History    Occupation: /sales   Tobacco Use    Smoking status: Former     Current packs/day: 0.00     Average packs/day: 1 pack/day for 8.0 years (8.0 ttl pk-yrs)     Types: Cigarettes     Start date:      Quit date:      Years since quittin.5    Smokeless tobacco: Never   Substance and Sexual Activity    Alcohol use: Not Currently     Comment: none since     Drug use: Not Currently    Sexual activity: Yes     Partners: Female       FAMILY HISTORY    Family History   Problem Relation Name Age of Onset    Bladder Cancer Mother  69    Diabetes Mother      Hypertension Mother      No Known Problems Sister      Chronic back pain Brother          lower back injury    Diabetes Brother      Neuropathy Brother         REVIEW OF SYSTEMS   ROS  All Systems otherwise negative except as noted in the History of Present Illness.        PHYSICAL EXAM    Reviewed Triage Note  VITAL SIGNS:   ED Triage Vitals [24 0929]   Enc Vitals Group      /67      Pulse 60      Resp 17      Temp 98.1 °F (36.7 °C)      Temp Source Oral      SpO2 99 %      Weight 300 lb      Height 6' 3"      Head Circumference       Peak Flow       Pain Score       Pain Loc       Pain Education       Exclude from Growth Chart        Patient Vitals for the past 24 hrs:   BP Temp Temp src Pulse Resp SpO2 Height Weight   24 0929 136/67 98.1 °F (36.7 °C) Oral 60 17 99 % 6' 3" (1.905 m) 136.1 kg (300 lb)           Physical Exam    Constitutional:  Well-developed, well-nourished. No acute distress  HENT:  Normocephalic, atraumatic.  Eyes:  EOMI. Conjunctiva normal without discharge.   Neck: Normal range of motion.No stridor. No meningismus.   Respiratory:  No respiratory distress, retractions, or conversational dyspnea. Cardiovascular:  Normal heart rate. No pitting lower extremity edema.   Musculoskeletal:  No gross deformity or limited range of motion of all major joints.   Integument:  Warm and dry. No " rash.  Neurologic:  Normal motor function. No focal deficits noted. Alert and Interactive.  Psychiatric:  Affect normal. Mood normal.         LABS  Pertinent labs reviewed. (See chart for details)   Labs Reviewed - No data to display      RADIOLOGY    Imaging Results    None         PROCEDURES    Procedures      EKG     Interpreted by ERP:         ED COURSE & MEDICAL DECISION MAKING    Pertinent & Imaging studies reviewed. (See chart for details and specific orders.)        Medications   dexAMETHasone injection 8 mg (has no administration in time range)   ketorolac injection 30 mg (has no administration in time range)                 DISPOSITION  Patient discharged in stable condition at No discharge date for patient encounter.      DISCHARGE INSTRUCTIONS & MEDS       Medication List        START taking these medications      ibuprofen 800 MG tablet  Commonly known as: ADVIL,MOTRIN  Take 1 tablet (800 mg total) by mouth every 8 (eight) hours as needed for Pain.     LIDOcaine 5 %  Commonly known as: LIDODERM  Place 1 patch onto the skin daily as needed (low back pain). Remove & Discard patch within 12 hours or as directed by MD     methocarbamoL 750 MG Tab  Commonly known as: ROBAXIN  Take 2 tablets (1,500 mg total) by mouth every 8 (eight) hours as needed (muscle spasm).            ASK your doctor about these medications      carvediloL 6.25 MG tablet  Commonly known as: COREG  TAKE 1 TABLET BY MOUTH TWICE A DAY     FREESTYLE RIVERA 2 SENSOR Kit  Generic drug: flash glucose sensor  Dx: E11.9     JARDIANCE 25 mg tablet  Generic drug: empagliflozin     levothyroxine 175 MCG tablet  Commonly known as: SYNTHROID, LEVOTHROID  TAKE 1 TABLET BY MOUTH EVERY DAY     lisinopriL-hydrochlorothiazide 20-25 mg Tab  Commonly known as: PRINZIDE,ZESTORETIC  TAKE 1 TABLET BY MOUTH EVERY DAY     metFORMIN 500 MG tablet  Commonly known as: GLUCOPHAGE  Take 2 tablets (1,000 mg total) by mouth 2 (two) times daily.     pioglitazone 45 MG  tablet  Commonly known as: ACTOS  Take 1 tablet (45 mg total) by mouth once daily.     rosuvastatin 10 MG tablet  Commonly known as: CRESTOR  Take 1 tablet (10 mg total) by mouth once daily.               Where to Get Your Medications        These medications were sent to [facility] [address] [facility] - [place], [place] [address] [place] [address]      Phone: [phone]   ibuprofen 800 MG tablet  LIDOcaine 5 %  methocarbamoL 750 MG Tab           New Prescriptions    IBUPROFEN (ADVIL,MOTRIN) 800 MG TABLET    Take 1 tablet (800 mg total) by mouth every 8 (eight) hours as needed for Pain.    LIDOCAINE (LIDODERM) 5 %    Place 1 patch onto the skin daily as needed (low back pain). Remove & Discard patch within 12 hours or as directed by MD    METHOCARBAMOL (ROBAXIN) 750 MG TAB    Take 2 tablets (1,500 mg total) by mouth every 8 (eight) hours as needed (muscle spasm).           FINAL IMPRESSION    1. Acute midline low back pain without sciatica              Blood Pressure Follow-Up Advised  Patient advised to follow up with PCP within 3-5 days for blood pressure re-check if blood pressure is equal to or greater than 120/80.         Critical care time spent with this patient (not including separately billable items) was  0 minutes.     DISCLAIMER: This note was prepared with Dragon NaturallySpeaking voice recognition transcription software. Garbled syntax, mangled pronouns, and other bizarre constructions may be attributed to that software system.      Getachew Sung MD  07/29/2024  10:13 AM           Getachew Sung MD  07/29/24 1016